# Patient Record
Sex: MALE | Race: WHITE | NOT HISPANIC OR LATINO | Employment: FULL TIME | URBAN - METROPOLITAN AREA
[De-identification: names, ages, dates, MRNs, and addresses within clinical notes are randomized per-mention and may not be internally consistent; named-entity substitution may affect disease eponyms.]

---
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
## 2021-10-07 ENCOUNTER — VIRTUAL VISIT (OUTPATIENT)
Dept: FAMILY MEDICINE | Facility: CLINIC | Age: 21
End: 2021-10-07

## 2021-10-07 DIAGNOSIS — R43.2 LOSS OF TASTE: Primary | ICD-10-CM

## 2021-10-07 DIAGNOSIS — R19.7 DIARRHEA, UNSPECIFIED TYPE: ICD-10-CM

## 2021-10-07 PROCEDURE — 99213 OFFICE O/P EST LOW 20 MIN: CPT | Mod: 95 | Performed by: NURSE PRACTITIONER

## 2021-10-07 NOTE — PROGRESS NOTES
You is a 21 year old who is being evaluated via a billable telephone visit.      What phone number would you like to be contacted at? cell  How would you like to obtain your AVS? Mail a copy    Assessment & Plan     Loss of taste  Symptoms for a few days, needs test to return to work  - Symptomatic COVID-19 Virus (Coronavirus) by PCR Nasopharyngeal; Future    Diarrhea, unspecified type  As above  - Symptomatic COVID-19 Virus (Coronavirus) by PCR Nasopharyngeal; Future      Return in about 1 week (around 10/14/2021), or if symptoms worsen or fail to improve.    ROLF Reyes, NP-C  [facility]   You is a 21 year old who presents for the following health issues  accompanied by his self:    HPI       For a few days: Loss of sense of taste. When he eats nothing tastes, if drinking a soda, it just tastes like spiky water. No fever. Had some diarrhea. No cough or SOB. Someone in the house has been sick but not with COVID-19. 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 Wears a mask when at work. Normal urination. Some chest pains but that is normal with him.         Review of Systems   Constitutional, HEENT, cardiovascular, pulmonary, gi and gu systems are negative, except as otherwise noted.      Objective           Vitals:  No vitals were obtained today due to virtual visit.    Physical Exam   healthy, alert and no distress  PSYCH: Alert and oriented times 3; coherent speech, normal   rate and volume, able to articulate logical thoughts, able   to abstract reason, no tangential thoughts, no hallucinations   or delusions  His affect is normal  RESP: No cough, no audible wheezing, able to talk in full sentences  Remainder of exam unable to be completed due to telephone visits    No results found for this or any previous visit (from the past 24 hour(s)).      Phone call duration: 6 minutes

## 2021-10-07 NOTE — LETTER
October 7, 2021      You Park  [address]   [place] [facility] [id number]

## 2021-10-07 NOTE — PATIENT INSTRUCTIONS
"Discharge Instructions for COVID-19 Patients  You have--or may have--COVID-19. Please follow the instructions listed below.   If you have a weakened immune system, discuss with your doctor any other actions you need to take.  How can I protect others?  If you have symptoms (fever, cough, body aches or trouble breathing):    Stay home and away from others (self-isolate) until:  ? Your other symptoms have resolved (gotten better). And   ? You've had no fever--and no medicine that reduces fever--for 1 full day (24 hours). And   ? At least 10 days have passed since your symptoms started. (You may need to wait 20 days. Follow the advice of your care team.)  If you don't show symptoms, but testing showed that you have COVID-19:    Stay home and away from others (self-isolate) until at least 10 days have passed since the date of your first positive COVID-19 test.  During this time    Stay in your own room, even for meals. Use your own bathroom if you can.    Stay away from others in your home. No hugging, kissing or shaking hands. No visitors.    Don't go to work, school or anywhere else.    Clean \"high touch\" surfaces often (doorknobs, counters, handles). Use household cleaning spray or wipes.    You'll find a full list of  on the EPA website: www.epa.gov/pesticide-registration/list-n-disinfectants-use-against-sars-cov-2.    Cover your mouth and nose with a mask or other face covering to avoid spreading germs.    Wash your hands and face often. Use soap and water.    Caregivers in these groups are at risk for severe illness due to COVID-19:  ? People 65 years and older  ? People who live in a nursing home or long-term care facility  ? People with chronic disease (lung, heart, cancer, diabetes, kidney, liver, immunologic)  ? People who have a weakened immune system, including those who:    Are in cancer treatment    Take medicine that weakens the immune system, such as corticosteroids    Had a bone marrow or organ " transplant    Have an immune deficiency    Have poorly controlled HIV or AIDS    Are obese (body mass index of 40 or higher)    Smoke regularly    Caregivers should wear gloves while washing dishes, handling laundry and cleaning bedrooms and bathrooms.    Use caution when washing and drying laundry: Don't shake dirty laundry and use the warmest water setting that you can.    For more tips on managing your health at home, go to www.cdc.gov/coronavirus/2019-ncov/downloads/10Things.pdf.  How can I take care of myself at home?  1. Get lots of rest. Drink extra fluids (unless a doctor has told you not to).  2. Take Tylenol (acetaminophen) for fever or pain. If you have liver or kidney problems, ask your family doctor if it's okay to take Tylenol.   Adults can take either:   ? 650 mg (two 325 mg pills) every 4 to 6 hours, or   ? 1,000 mg (two 500 mg pills) every 8 hours as needed.  ? Note: Don't take more than 3,000 mg in one day. Acetaminophen is found in many medicines (both prescribed and over-the-counter medicines). Read all labels to be sure you don't take too much.   For children, check the Tylenol bottle for the right dose. The dose is based on the child's age or weight.  3. If you have other health problems (like cancer, heart failure, an organ transplant or severe kidney disease): Call your specialty clinic if you don't feel better in the next 2 days.  4. Know when to call 911. Emergency warning signs include:  ? Trouble breathing or shortness of breath  ? Pain or pressure in the chest that doesn't go away  ? Feeling confused like you haven't felt before, or not being able to wake up  ? Bluish-colored lips or face  5. Your doctor may have prescribed a blood thinner medicine. Follow their instructions.  Where can I get more information?     [facility] - About COVID-19:   [email or web address]    CDC - What to Do If You're Sick:  www.cdc.gov/coronavirus/2019-ncov/about/steps-when-sick.html    CDC - Ending Home Isolation: www.cdc.gov/coronavirus/2019-ncov/hcp/disposition-in-home-patients.html    CDC - Caring for Someone: www.cdc.gov/coronavirus/2019-ncov/if-you-are-sick/care-for-someone.html    [facility] - Interim Guidance for Hospital Discharge to Home: www.health.[facility].[place]./diseases/coronavirus/hcp/hospdischarge.pdf    Below are the COVID-19 hotlines at the [place] Department of Health ([facility]). Interpreters are available.  ? For health questions: Call 077-539-2854 or 1-172.956.2752 (7 a.m. to 7 p.m.)  ? For questions about schools and childcare: Call 628-008-8836 or 1-886.186.9252 (7 a.m. to 7 p.m.)    For informational purposes only. Not to replace the advice of your health care provider. Clinically reviewed by Dr. Jason Mcgarry.   Copyright   2020 [facility]. All rights reserved. [employer] 286867 - REV 01/05/21.

## 2021-10-09 ENCOUNTER — LAB (OUTPATIENT)
Dept: URGENT CARE | Facility: URGENT CARE | Age: 21
End: 2021-10-09
Attending: NURSE PRACTITIONER

## 2021-10-09 DIAGNOSIS — R43.2 LOSS OF TASTE: ICD-10-CM

## 2021-10-09 DIAGNOSIS — R19.7 DIARRHEA, UNSPECIFIED TYPE: ICD-10-CM

## 2021-10-09 PROCEDURE — U0005 INFEC AGEN DETEC AMPLI PROBE: HCPCS | Mod: 90

## 2021-10-09 PROCEDURE — 99000 SPECIMEN HANDLING OFFICE-LAB: CPT

## 2021-10-09 PROCEDURE — U0003 INFECTIOUS AGENT DETECTION BY NUCLEIC ACID (DNA OR RNA); SEVERE ACUTE RESPIRATORY SYNDROME CORONAVIRUS 2 (SARS-COV-2) (CORONAVIRUS DISEASE [COVID-19]), AMPLIFIED PROBE TECHNIQUE, MAKING USE OF HIGH THROUGHPUT TECHNOLOGIES AS DESCRIBED BY CMS-2020-01-R: HCPCS | Mod: 90

## 2021-10-11 LAB — SARS-COV-2 RNA RESP QL NAA+PROBE: NOT DETECTED

## 2021-10-12 NOTE — RESULT ENCOUNTER NOTE
Pramod Orta,   Your COVID-19 swab was negative. If you have questions please let me know.Thank you,  ROLF Reyes, NP-C  [facility]

## 2021-11-28 ENCOUNTER — HEALTH MAINTENANCE LETTER (OUTPATIENT)
Age: 21
End: 2021-11-28

## 2022-09-11 ENCOUNTER — HEALTH MAINTENANCE LETTER (OUTPATIENT)
Age: 22
End: 2022-09-11

## 2023-01-22 ENCOUNTER — HEALTH MAINTENANCE LETTER (OUTPATIENT)
Age: 23
End: 2023-01-22

## 2024-02-24 ENCOUNTER — HEALTH MAINTENANCE LETTER (OUTPATIENT)
Age: 24
End: 2024-02-24

## 2024-05-30 ENCOUNTER — HOSPITAL ENCOUNTER (EMERGENCY)
Facility: CLINIC | Age: 24
Discharge: HOME | End: 2024-05-31
Attending: EMERGENCY MEDICINE | Admitting: EMERGENCY MEDICINE
Payer: COMMERCIAL

## 2024-05-30 DIAGNOSIS — R10.10 UPPER ABDOMINAL PAIN: ICD-10-CM

## 2024-05-30 DIAGNOSIS — K82.8 GALLBLADDER SLUDGE: ICD-10-CM

## 2024-05-30 DIAGNOSIS — K80.20 CALCULUS OF GALLBLADDER WITHOUT CHOLECYSTITIS WITHOUT OBSTRUCTION: ICD-10-CM

## 2024-05-30 LAB
BASOPHILS # BLD AUTO: 0.1 10E3/UL (ref 0–0.2)
BASOPHILS NFR BLD AUTO: 1 %
EOSINOPHIL # BLD AUTO: 0.1 10E3/UL (ref 0–0.7)
EOSINOPHIL NFR BLD AUTO: 2 %
ERYTHROCYTE [DISTWIDTH] IN BLOOD BY AUTOMATED COUNT: 12.1 % (ref 10–15)
HCT VFR BLD AUTO: 41.9 % (ref 40–53)
HGB BLD-MCNC: 14.7 G/DL (ref 13.3–17.7)
IMM GRANULOCYTES # BLD: 0 10E3/UL
IMM GRANULOCYTES NFR BLD: 0 %
LYMPHOCYTES # BLD AUTO: 2.2 10E3/UL (ref 0.8–5.3)
LYMPHOCYTES NFR BLD AUTO: 26 %
MCH RBC QN AUTO: 28.9 PG (ref 26.5–33)
MCHC RBC AUTO-ENTMCNC: 35.1 G/DL (ref 31.5–36.5)
MCV RBC AUTO: 82 FL (ref 78–100)
MONOCYTES # BLD AUTO: 0.5 10E3/UL (ref 0–1.3)
MONOCYTES NFR BLD AUTO: 6 %
NEUTROPHILS # BLD AUTO: 5.8 10E3/UL (ref 1.6–8.3)
NEUTROPHILS NFR BLD AUTO: 65 %
NRBC # BLD AUTO: 0 10E3/UL
NRBC BLD AUTO-RTO: 0 /100
PLATELET # BLD AUTO: 276 10E3/UL (ref 150–450)
RBC # BLD AUTO: 5.09 10E6/UL (ref 4.4–5.9)
WBC # BLD AUTO: 8.7 10E3/UL (ref 4–11)

## 2024-05-30 PROCEDURE — 250N000011 HC RX IP 250 OP 636: Performed by: EMERGENCY MEDICINE

## 2024-05-30 PROCEDURE — 250N000013 HC RX MED GY IP 250 OP 250 PS 637: Performed by: EMERGENCY MEDICINE

## 2024-05-30 PROCEDURE — 96374 THER/PROPH/DIAG INJ IV PUSH: CPT

## 2024-05-30 PROCEDURE — 82040 ASSAY OF SERUM ALBUMIN: CPT | Performed by: EMERGENCY MEDICINE

## 2024-05-30 PROCEDURE — 36415 COLL VENOUS BLD VENIPUNCTURE: CPT | Performed by: EMERGENCY MEDICINE

## 2024-05-30 PROCEDURE — 85025 COMPLETE CBC W/AUTO DIFF WBC: CPT | Performed by: EMERGENCY MEDICINE

## 2024-05-30 PROCEDURE — 99285 EMERGENCY DEPT VISIT HI MDM: CPT | Mod: 25

## 2024-05-30 PROCEDURE — 83690 ASSAY OF LIPASE: CPT | Performed by: EMERGENCY MEDICINE

## 2024-05-30 RX ORDER — FAMOTIDINE 20 MG/1
20 TABLET, FILM COATED ORAL ONCE
Status: COMPLETED | OUTPATIENT
Start: 2024-05-30 | End: 2024-05-30

## 2024-05-30 RX ORDER — KETOROLAC TROMETHAMINE 15 MG/ML
15 INJECTION, SOLUTION INTRAMUSCULAR; INTRAVENOUS ONCE
Status: COMPLETED | OUTPATIENT
Start: 2024-05-30 | End: 2024-05-30

## 2024-05-30 RX ORDER — MAGNESIUM HYDROXIDE/ALUMINUM HYDROXICE/SIMETHICONE 120; 1200; 1200 MG/30ML; MG/30ML; MG/30ML
30 SUSPENSION ORAL ONCE
Status: COMPLETED | OUTPATIENT
Start: 2024-05-30 | End: 2024-05-30

## 2024-05-30 RX ADMIN — ALUMINUM HYDROXIDE, MAGNESIUM HYDROXIDE, AND SIMETHICONE 30 ML: 1200; 120; 1200 SUSPENSION ORAL at 23:33

## 2024-05-30 RX ADMIN — KETOROLAC TROMETHAMINE 15 MG: 15 INJECTION, SOLUTION INTRAMUSCULAR; INTRAVENOUS at 23:33

## 2024-05-30 RX ADMIN — FAMOTIDINE 20 MG: 20 TABLET ORAL at 23:33

## 2024-05-30 ASSESSMENT — COLUMBIA-SUICIDE SEVERITY RATING SCALE - C-SSRS
2. HAVE YOU ACTUALLY HAD ANY THOUGHTS OF KILLING YOURSELF IN THE PAST MONTH?: NO
1. IN THE PAST MONTH, HAVE YOU WISHED YOU WERE DEAD OR WISHED YOU COULD GO TO SLEEP AND NOT WAKE UP?: NO
6. HAVE YOU EVER DONE ANYTHING, STARTED TO DO ANYTHING, OR PREPARED TO DO ANYTHING TO END YOUR LIFE?: NO

## 2024-05-30 ASSESSMENT — ACTIVITIES OF DAILY LIVING (ADL): ADLS_ACUITY_SCORE: 33

## 2024-05-30 NOTE — Clinical Note
You aPrk was seen and treated in our emergency department on 5/30/2024.  He may return to work on 06/02/2024.       If you have any questions or concerns, please don't hesitate to call.      Louise Chang MD

## 2024-05-31 ENCOUNTER — APPOINTMENT (OUTPATIENT)
Dept: ULTRASOUND IMAGING | Facility: CLINIC | Age: 24
End: 2024-05-31
Attending: EMERGENCY MEDICINE
Payer: COMMERCIAL

## 2024-05-31 VITALS
BODY MASS INDEX: 29.48 KG/M2 | HEART RATE: 91 BPM | HEIGHT: 66 IN | SYSTOLIC BLOOD PRESSURE: 144 MMHG | OXYGEN SATURATION: 98 % | DIASTOLIC BLOOD PRESSURE: 85 MMHG | TEMPERATURE: 97 F | RESPIRATION RATE: 16 BRPM | WEIGHT: 183.42 LBS

## 2024-05-31 LAB
ALBUMIN SERPL BCG-MCNC: 4.6 G/DL (ref 3.5–5.2)
ALBUMIN UR-MCNC: NEGATIVE MG/DL
ALP SERPL-CCNC: 77 U/L (ref 40–150)
ALT SERPL W P-5'-P-CCNC: 31 U/L (ref 0–70)
AMORPH CRY #/AREA URNS HPF: ABNORMAL /HPF
ANION GAP SERPL CALCULATED.3IONS-SCNC: 14 MMOL/L (ref 7–15)
APPEARANCE UR: ABNORMAL
AST SERPL W P-5'-P-CCNC: 22 U/L (ref 0–45)
BILIRUB SERPL-MCNC: 1 MG/DL
BILIRUB UR QL STRIP: NEGATIVE
BUN SERPL-MCNC: 18.5 MG/DL (ref 6–20)
CALCIUM SERPL-MCNC: 9.1 MG/DL (ref 8.6–10)
CHLORIDE SERPL-SCNC: 102 MMOL/L (ref 98–107)
COLOR UR AUTO: YELLOW
CREAT SERPL-MCNC: 1.1 MG/DL (ref 0.67–1.17)
DEPRECATED HCO3 PLAS-SCNC: 23 MMOL/L (ref 22–29)
EGFRCR SERPLBLD CKD-EPI 2021: >90 ML/MIN/1.73M2
GLUCOSE SERPL-MCNC: 97 MG/DL (ref 70–99)
GLUCOSE UR STRIP-MCNC: NEGATIVE MG/DL
HGB UR QL STRIP: NEGATIVE
KETONES UR STRIP-MCNC: 40 MG/DL
LEUKOCYTE ESTERASE UR QL STRIP: NEGATIVE
LIPASE SERPL-CCNC: 25 U/L (ref 13–60)
NITRATE UR QL: NEGATIVE
PH UR STRIP: 7 [PH] (ref 5–7)
POTASSIUM SERPL-SCNC: 3.6 MMOL/L (ref 3.4–5.3)
PROT SERPL-MCNC: 7.1 G/DL (ref 6.4–8.3)
RBC URINE: 3 /HPF
SODIUM SERPL-SCNC: 139 MMOL/L (ref 135–145)
SP GR UR STRIP: 1.02 (ref 1–1.03)
UROBILINOGEN UR STRIP-MCNC: 3 MG/DL
WBC URINE: 0 /HPF

## 2024-05-31 PROCEDURE — 76705 ECHO EXAM OF ABDOMEN: CPT

## 2024-05-31 PROCEDURE — 81001 URINALYSIS AUTO W/SCOPE: CPT | Performed by: EMERGENCY MEDICINE

## 2024-05-31 PROCEDURE — 96375 TX/PRO/DX INJ NEW DRUG ADDON: CPT

## 2024-05-31 PROCEDURE — 250N000013 HC RX MED GY IP 250 OP 250 PS 637: Performed by: EMERGENCY MEDICINE

## 2024-05-31 PROCEDURE — 250N000011 HC RX IP 250 OP 636: Mod: JZ | Performed by: EMERGENCY MEDICINE

## 2024-05-31 RX ORDER — MAGNESIUM HYDROXIDE/ALUMINUM HYDROXICE/SIMETHICONE 120; 1200; 1200 MG/30ML; MG/30ML; MG/30ML
30 SUSPENSION ORAL ONCE
Status: COMPLETED | OUTPATIENT
Start: 2024-05-31 | End: 2024-05-31

## 2024-05-31 RX ORDER — MORPHINE SULFATE 4 MG/ML
4 INJECTION, SOLUTION INTRAMUSCULAR; INTRAVENOUS ONCE
Status: COMPLETED | OUTPATIENT
Start: 2024-05-31 | End: 2024-05-31

## 2024-05-31 RX ORDER — OXYCODONE HYDROCHLORIDE 5 MG/1
5-10 TABLET ORAL EVERY 6 HOURS PRN
Qty: 10 TABLET | Refills: 0 | Status: SHIPPED | OUTPATIENT
Start: 2024-05-31 | End: 2024-06-03

## 2024-05-31 RX ORDER — OXYCODONE HYDROCHLORIDE 5 MG/1
10 TABLET ORAL ONCE
Status: COMPLETED | OUTPATIENT
Start: 2024-05-31 | End: 2024-05-31

## 2024-05-31 RX ORDER — FAMOTIDINE 20 MG/1
20 TABLET, FILM COATED ORAL ONCE
Status: COMPLETED | OUTPATIENT
Start: 2024-05-31 | End: 2024-05-31

## 2024-05-31 RX ORDER — FAMOTIDINE 20 MG/1
20 TABLET, FILM COATED ORAL 2 TIMES DAILY
Qty: 14 TABLET | Refills: 0 | Status: SHIPPED | OUTPATIENT
Start: 2024-05-31 | End: 2024-06-07

## 2024-05-31 RX ADMIN — OXYCODONE HYDROCHLORIDE 10 MG: 5 TABLET ORAL at 01:57

## 2024-05-31 RX ADMIN — FAMOTIDINE 20 MG: 20 TABLET ORAL at 01:57

## 2024-05-31 RX ADMIN — ALUMINUM HYDROXIDE, MAGNESIUM HYDROXIDE, AND SIMETHICONE 30 ML: 200; 200; 20 SUSPENSION ORAL at 01:57

## 2024-05-31 RX ADMIN — MORPHINE SULFATE 4 MG: 4 INJECTION INTRAVENOUS at 00:15

## 2024-05-31 ASSESSMENT — ACTIVITIES OF DAILY LIVING (ADL)
ADLS_ACUITY_SCORE: 35
ADLS_ACUITY_SCORE: 35

## 2024-05-31 NOTE — ED PROVIDER NOTES
"  Emergency Department Note      History of Present Illness     Chief Complaint  Abdominal Pain    HPI  You Park is a 23 year old male who presents emergency department with upper abdominal pain that he finds difficult to describe it is localized to the upper abdomen.  This started around 4 PM.  Denies any known trigger.  Denies any associate symptoms such as nausea, vomiting or diarrhea.  He has had regular bowel movements.  He denies any bloody or black appearance to the stool.  He denies fever or chills.  He notes he had similar pain in the past that resolved on its own he not think too much of it so has not sought assessment for it.  He denies urine changes.  He denies alcohol use or regular NSAID use.    Independent Historian  None    Review of External Notes  None  Past Medical History   Medical History and Problem List  Past Medical History:   Diagnosis Date    Seizures (H)        Medications  Denies daily medications    Surgical History   No past surgical history on file.  Physical Exam   Patient Vitals for the past 24 hrs:   BP Temp Temp src Pulse Resp SpO2 Height Weight   05/30/24 2204 (!) 149/85 97  F (36.1  C) Temporal 96 18 100 % 1.676 m (5' 6\") 83.2 kg (183 lb 6.8 oz)     Physical Exam  General: Adult male sitting upright  Eyes: PERRL, Conjunctive within normal limits.  No scleral icterus.  ENT: Moist mucous membranes, oropharynx clear.   CV: Normal S1S2, no murmur, rub or gallop. Regular rate and rhythm  Resp: Clear to auscultation bilaterally, no wheezes, rales or rhonchi. Normal respiratory effort.  GI: Abdomen is soft nondistended.  Epigastric and right upper quadrant tenderness to palpation.  No palpable masses. No rebound or guarding.  MSK: No edema.  Normal active range of motion.  Skin: Warm and dry. No rashes or lesions or ecchymoses on visible skin.  Neuro: Alert and oriented. Responds appropriately to all questions and commands. No focal findings appreciated. Normal muscle " tone.  Psych: Appropriate mood and affect    Diagnostics   Lab Results   Labs Ordered and Resulted from Time of ED Arrival to Time of ED Departure   ROUTINE UA WITH MICROSCOPIC REFLEX TO CULTURE - Abnormal       Result Value    Color Urine Yellow      Appearance Urine Cloudy (*)     Glucose Urine Negative      Bilirubin Urine Negative      Ketones Urine 40 (*)     Specific Gravity Urine 1.023      Blood Urine Negative      pH Urine 7.0      Protein Albumin Urine Negative      Urobilinogen Urine 3.0 (*)     Nitrite Urine Negative      Leukocyte Esterase Urine Negative      Amorphous Crystals Urine Few (*)     RBC Urine 3 (*)     WBC Urine 0     COMPREHENSIVE METABOLIC PANEL - Normal    Sodium 139      Potassium 3.6      Carbon Dioxide (CO2) 23      Anion Gap 14      Urea Nitrogen 18.5      Creatinine 1.10      GFR Estimate >90      Calcium 9.1      Chloride 102      Glucose 97      Alkaline Phosphatase 77      AST 22      ALT 31      Protein Total 7.1      Albumin 4.6      Bilirubin Total 1.0     LIPASE - Normal    Lipase 25     CBC WITH PLATELETS AND DIFFERENTIAL    WBC Count 8.7      RBC Count 5.09      Hemoglobin 14.7      Hematocrit 41.9      MCV 82      MCH 28.9      MCHC 35.1      RDW 12.1      Platelet Count 276      % Neutrophils 65      % Lymphocytes 26      % Monocytes 6      % Eosinophils 2      % Basophils 1      % Immature Granulocytes 0      NRBCs per 100 WBC 0      Absolute Neutrophils 5.8      Absolute Lymphocytes 2.2      Absolute Monocytes 0.5      Absolute Eosinophils 0.1      Absolute Basophils 0.1      Absolute Immature Granulocytes 0.0      Absolute NRBCs 0.0         Imaging  US Abdomen Limited   Final Result   IMPRESSION:   Sludge and stones in the gallbladder. No specific evidence for acute cholecystitis.              Independent Interpretation  None  ED Course    Medications Administered  Medications   ketorolac (TORADOL) injection 15 mg (15 mg Intravenous $Given 5/30/24 6981)   famotidine  (PEPCID) tablet 20 mg (20 mg Oral $Given 5/30/24 2333)   alum & mag hydroxide-simethicone (MAALOX) suspension 30 mL (30 mLs Oral $Given 5/30/24 2333)   morphine (PF) injection 4 mg (4 mg Intravenous $Given 5/31/24 0015)   oxyCODONE (ROXICODONE) tablet 10 mg (10 mg Oral $Given 5/31/24 0157)   famotidine (PEPCID) tablet 20 mg (20 mg Oral $Given 5/31/24 0157)   alum & mag hydroxide-simethicone (MAALOX) suspension 30 mL (30 mLs Oral $Given 5/31/24 0157)         Discussion of Management  None    Social Determinants of Health adding to complexity of care  None    ED Course  Past medical records, nursing notes, and vitals reviewed.  2113: I performed an exam of the patient and obtained history, as documented above.   I reassessed the patient.  I discussed findings of today's evaluation.  He pulls himself up into bed without any obvious discomfort.  He looks more comfortable than on arrival.  He notes mild ongoing pain but is agreeable to plan for discharge.  He has been tolerating p.o.    Medical Decision Making / Diagnosis   MDM  You Park is a 23 year old male who presents emergency department concerns for upper abdominal pain.  Multiple etiologies were considered including PUD, gastritis, cholecystitis, biliary colic, hepatitis, pancreatitis, etc.  The rest of his abdomen is benign aside from epigastric and right upper quadrant region.  He has gallbladder sludge and gallstones noted.  No signs of cholecystitis.  No signs of obstructive pathology on labs.  No leukocytosis or fever.  This seems less likely to represent acute life-threatening pathology at this time given the patient's improvement with symptoms and reassuring evaluation I feel comfortable discharging him home.  This still could be biliary colic and/or peptic ulcer disease or gastritis.  Supportive care measures discussed at home.  Symptomatic treatment discussed.  Primary care referral made for the patient for follow-up in the upcoming days.  Return  precautions including fever, increasing pain, uncontrolled vomiting, etc. were discussed with the patient.  All questions were answered prior to discharge.    Disposition  The patient was discharged.     ICD-10 Codes:    ICD-10-CM    1. Upper abdominal pain  R10.10 Primary Care Referral      2. Calculus of gallbladder without cholecystitis without obstruction  K80.20 Primary Care Referral      3. Gallbladder sludge  K82.8 Primary Care Referral           Discharge Medications  New Prescriptions    FAMOTIDINE (PEPCID) 20 MG TABLET    Take 1 tablet (20 mg) by mouth 2 times daily for 7 days    OXYCODONE (ROXICODONE) 5 MG TABLET    Take 1-2 tablets (5-10 mg) by mouth every 6 hours as needed for severe pain         MD Bernardo Reyes Tracy Dianne, MD  05/31/24 0228

## 2024-05-31 NOTE — ED TRIAGE NOTES
Pt arrives with abdominal pain that he states started at approx 1800.  He states it is a very strong pain that he cannot describe. Denies diarrhea or nausea      Triage Assessment (Adult)       Row Name 05/30/24 2202          Triage Assessment    Airway WDL WDL        Respiratory WDL    Respiratory WDL WDL        Cardiac WDL    Cardiac WDL WDL

## 2024-06-06 ENCOUNTER — OFFICE VISIT (OUTPATIENT)
Dept: FAMILY MEDICINE | Facility: CLINIC | Age: 24
End: 2024-06-06
Payer: COMMERCIAL

## 2024-06-06 VITALS
DIASTOLIC BLOOD PRESSURE: 84 MMHG | WEIGHT: 177 LBS | SYSTOLIC BLOOD PRESSURE: 126 MMHG | HEIGHT: 66 IN | OXYGEN SATURATION: 98 % | TEMPERATURE: 98.5 F | BODY MASS INDEX: 28.45 KG/M2 | HEART RATE: 84 BPM | RESPIRATION RATE: 16 BRPM

## 2024-06-06 DIAGNOSIS — K82.8 GALLBLADDER SLUDGE: ICD-10-CM

## 2024-06-06 DIAGNOSIS — K80.20 CALCULUS OF GALLBLADDER WITHOUT CHOLECYSTITIS WITHOUT OBSTRUCTION: ICD-10-CM

## 2024-06-06 DIAGNOSIS — R10.10 UPPER ABDOMINAL PAIN: Primary | ICD-10-CM

## 2024-06-06 PROCEDURE — G2211 COMPLEX E/M VISIT ADD ON: HCPCS

## 2024-06-06 PROCEDURE — 99213 OFFICE O/P EST LOW 20 MIN: CPT

## 2024-06-06 ASSESSMENT — PAIN SCALES - GENERAL: PAINLEVEL: NO PAIN (0)

## 2024-06-06 NOTE — PROGRESS NOTES
Assessment & Plan     Upper abdominal pain  Calculus of gallbladder without cholecystitis without obstruction  Gallbladder sludge  Patient presents for ED follow up from 5/30/24 for abdominal pain. US showed gallbladder sludge and gallstones without characteristics of cholecystitis. Patient has been following an altered diet which has greatly improved his pain. He has not needed to use any pain medication. Discussed pathophysiology of gallstones. Discussed treatment options including dietary restriction vs general surgery referral. At this time patient opted to see how dietary changes work for him. He is educated regarding worrisome symptoms and when to present for emergent evaluation. He is educated should he not have relief with dietary changes that he can let me know and I can place a referral to general surgery. The patient expressed understanding and was in agreement with the plan of care.    The longitudinal plan of care for the diagnosis(es)/condition(s) as documented were addressed during this visit. Due to the added complexity in care, I will continue to support Tj in the subsequent management and with ongoing continuity of care.      MED REC REQUIRED  Post Medication Reconciliation Status:       Subjective   Tj is a 23 year old, presenting for the following health issues:  Hospital F/U (Pt feels better, pt states wanting to know next steps. Having diarrhea in the mornings after coffee. )        6/6/2024     1:49 PM   Additional Questions   Roomed by Leydi MARTI LPN     HPI     Seen in the ED on 5/30/24 for abdominal pain. Had an abdominal US completed which showed sludge and gallstones with no specific evidence for acute cholecystitis.     Since his ED visit pain has slowly been decreasing. He has not needed the oxycodone at all for pain. No fevers. Was having some diarrhea but this has resolved. Denies any jaz colored, melena, hematochezia. No coca-cola colored urine. Hasn't noticed any jaundice or  "scleral icterus.     Does notice some pain a few hours after eating. Has been limiting his diet to lean meats and low carbohydrate/sugar foods.     Hospital Follow-up Visit:    Hospital/Nursing Home/IP Rehab Facility: [facility]  Date of Admission: 05/30/24  Date of Discharge: 05/31/24  Reason(s) for Admission: abdominal pain  Was the patient in the ICU or did the patient experience delirium during hospitalization?  No  Do you have any other stressors you would like to discuss with your provider? No    Problems taking medications regularly:  None  Medication changes since discharge: Oxycodone PRN (pt has not taken any)  Problems adhering to non-medication therapy:  None    Summary of hospitalization:  [facility] discharge summary reviewed  Diagnostic Tests/Treatments reviewed.  Follow up needed: none  Other Healthcare Providers Involved in Patient s Care:         None  Update since discharge: improved.       Plan of care communicated with patient           Review of Systems  Constitutional, HEENT, cardiovascular, pulmonary, gi and gu systems are negative, except as otherwise noted.      Objective    /84   Pulse 84   Temp 98.5  F (36.9  C) (Oral)   Resp 16   Ht 1.676 m (5' 6\")   Wt 80.3 kg (177 lb)   SpO2 98%   BMI 28.57 kg/m    Body mass index is 28.57 kg/m .  Physical Exam   GENERAL: alert and no distress  EYES: Eyes grossly normal to inspection, conjunctivae and sclerae normal  NECK: no visualized asymmetry, masses, or scars  RESP: normal respiratory effort  SKIN: no suspicious lesions or rashes on visualized skin  PSYCH: mentation appears normal, affect normal/bright    Admission on 05/30/2024, Discharged on 05/31/2024   Component Date Value Ref Range Status     Sodium 05/30/2024 139  135 - 145 mmol/L Final    Reference intervals for this test were updated on 09/26/2023 to more accurately reflect our healthy population. There may be differences in the flagging " of prior results with similar values performed with this method. Interpretation of those prior results can be made in the context of the updated reference intervals.      Potassium 05/30/2024 3.6  3.4 - 5.3 mmol/L Final     Carbon Dioxide (CO2) 05/30/2024 23  22 - 29 mmol/L Final     Anion Gap 05/30/2024 14  7 - 15 mmol/L Final     Urea Nitrogen 05/30/2024 18.5  6.0 - 20.0 mg/dL Final     Creatinine 05/30/2024 1.10  0.67 - 1.17 mg/dL Final     GFR Estimate 05/30/2024 >90  >60 mL/min/1.73m2 Final     Calcium 05/30/2024 9.1  8.6 - 10.0 mg/dL Final     Chloride 05/30/2024 102  98 - 107 mmol/L Final     Glucose 05/30/2024 97  70 - 99 mg/dL Final     Alkaline Phosphatase 05/30/2024 77  40 - 150 U/L Final     AST 05/30/2024 22  0 - 45 U/L Final    Reference intervals for this test were updated on 6/12/2023 to more accurately reflect our healthy population. There may be differences in the flagging of prior results with similar values performed with this method. Interpretation of those prior results can be made in the context of the updated reference intervals.     ALT 05/30/2024 31  0 - 70 U/L Final    Reference intervals for this test were updated on 6/12/2023 to more accurately reflect our healthy population. There may be differences in the flagging of prior results with similar values performed with this method. Interpretation of those prior results can be made in the context of the updated reference intervals.       Protein Total 05/30/2024 7.1  6.4 - 8.3 g/dL Final     Albumin 05/30/2024 4.6  3.5 - 5.2 g/dL Final     Bilirubin Total 05/30/2024 1.0  <=1.2 mg/dL Final     Lipase 05/30/2024 25  13 - 60 U/L Final     Color Urine 05/31/2024 Yellow  Colorless, Straw, Light Yellow, Yellow Final     Appearance Urine 05/31/2024 Cloudy (A)  Clear Final     Glucose Urine 05/31/2024 Negative  Negative mg/dL Final     Bilirubin Urine 05/31/2024 Negative  Negative Final     Ketones Urine 05/31/2024 40 (A)  Negative mg/dL Final      Specific Gravity Urine 05/31/2024 1.023  1.003 - 1.035 Final     Blood Urine 05/31/2024 Negative  Negative Final     pH Urine 05/31/2024 7.0  5.0 - 7.0 Final     Protein Albumin Urine 05/31/2024 Negative  Negative mg/dL Final     Urobilinogen Urine 05/31/2024 3.0 (A)  Normal, 2.0 mg/dL Final     Nitrite Urine 05/31/2024 Negative  Negative Final     Leukocyte Esterase Urine 05/31/2024 Negative  Negative Final     Amorphous Crystals Urine 05/31/2024 Few (A)  None Seen /HPF Final     RBC Urine 05/31/2024 3 (H)  <=2 /HPF Final     WBC Urine 05/31/2024 0  <=5 /HPF Final     WBC Count 05/30/2024 8.7  4.0 - 11.0 10e3/uL Final     RBC Count 05/30/2024 5.09  4.40 - 5.90 10e6/uL Final     Hemoglobin 05/30/2024 14.7  13.3 - 17.7 g/dL Final     Hematocrit 05/30/2024 41.9  40.0 - 53.0 % Final     MCV 05/30/2024 82  78 - 100 fL Final     MCH 05/30/2024 28.9  26.5 - 33.0 pg Final     MCHC 05/30/2024 35.1  31.5 - 36.5 g/dL Final     RDW 05/30/2024 12.1  10.0 - 15.0 % Final     Platelet Count 05/30/2024 276  150 - 450 10e3/uL Final     % Neutrophils 05/30/2024 65  % Final     % Lymphocytes 05/30/2024 26  % Final     % Monocytes 05/30/2024 6  % Final     % Eosinophils 05/30/2024 2  % Final     % Basophils 05/30/2024 1  % Final     % Immature Granulocytes 05/30/2024 0  % Final     NRBCs per 100 WBC 05/30/2024 0  <1 /100 Final     Absolute Neutrophils 05/30/2024 5.8  1.6 - 8.3 10e3/uL Final     Absolute Lymphocytes 05/30/2024 2.2  0.8 - 5.3 10e3/uL Final     Absolute Monocytes 05/30/2024 0.5  0.0 - 1.3 10e3/uL Final     Absolute Eosinophils 05/30/2024 0.1  0.0 - 0.7 10e3/uL Final     Absolute Basophils 05/30/2024 0.1  0.0 - 0.2 10e3/uL Final     Absolute Immature Granulocytes 05/30/2024 0.0  <=0.4 10e3/uL Final     Absolute NRBCs 05/30/2024 0.0  10e3/uL Final           Signed Electronically by: Michelle Holden PA-C

## 2024-09-15 ENCOUNTER — HOSPITAL ENCOUNTER (INPATIENT)
Facility: CLINIC | Age: 24
LOS: 1 days | Discharge: HOME | End: 2024-09-17
Attending: EMERGENCY MEDICINE | Admitting: INTERNAL MEDICINE
Payer: COMMERCIAL

## 2024-09-15 ENCOUNTER — NURSE TRIAGE (OUTPATIENT)
Dept: NURSING | Facility: CLINIC | Age: 24
End: 2024-09-15
Payer: COMMERCIAL

## 2024-09-15 ENCOUNTER — APPOINTMENT (OUTPATIENT)
Dept: ULTRASOUND IMAGING | Facility: CLINIC | Age: 24
End: 2024-09-15
Attending: EMERGENCY MEDICINE
Payer: COMMERCIAL

## 2024-09-15 DIAGNOSIS — K80.50 CHOLEDOCHOLITHIASIS: ICD-10-CM

## 2024-09-15 DIAGNOSIS — K81.0 ACUTE CHOLECYSTITIS: ICD-10-CM

## 2024-09-15 LAB
ALBUMIN SERPL BCG-MCNC: 4.8 G/DL (ref 3.5–5.2)
ALP SERPL-CCNC: 136 U/L (ref 40–150)
ALT SERPL W P-5'-P-CCNC: 529 U/L (ref 0–70)
ANION GAP SERPL CALCULATED.3IONS-SCNC: 12 MMOL/L (ref 7–15)
AST SERPL W P-5'-P-CCNC: 357 U/L (ref 0–45)
BASOPHILS # BLD AUTO: 0.1 10E3/UL (ref 0–0.2)
BASOPHILS NFR BLD AUTO: 1 %
BILIRUB SERPL-MCNC: 3.8 MG/DL
BUN SERPL-MCNC: 7.9 MG/DL (ref 6–20)
CALCIUM SERPL-MCNC: 9.7 MG/DL (ref 8.8–10.4)
CHLORIDE SERPL-SCNC: 103 MMOL/L (ref 98–107)
CREAT SERPL-MCNC: 1.03 MG/DL (ref 0.67–1.17)
EGFRCR SERPLBLD CKD-EPI 2021: >90 ML/MIN/1.73M2
EOSINOPHIL # BLD AUTO: 0.1 10E3/UL (ref 0–0.7)
EOSINOPHIL NFR BLD AUTO: 1 %
ERYTHROCYTE [DISTWIDTH] IN BLOOD BY AUTOMATED COUNT: 12.2 % (ref 10–15)
GLUCOSE SERPL-MCNC: 107 MG/DL (ref 70–99)
HCO3 SERPL-SCNC: 25 MMOL/L (ref 22–29)
HCT VFR BLD AUTO: 44.4 % (ref 40–53)
HGB BLD-MCNC: 15.4 G/DL (ref 13.3–17.7)
HOLD SPECIMEN: NORMAL
HOLD SPECIMEN: NORMAL
IMM GRANULOCYTES # BLD: 0 10E3/UL
IMM GRANULOCYTES NFR BLD: 0 %
LIPASE SERPL-CCNC: 24 U/L (ref 13–60)
LYMPHOCYTES # BLD AUTO: 1.3 10E3/UL (ref 0.8–5.3)
LYMPHOCYTES NFR BLD AUTO: 16 %
MCH RBC QN AUTO: 28.5 PG (ref 26.5–33)
MCHC RBC AUTO-ENTMCNC: 34.7 G/DL (ref 31.5–36.5)
MCV RBC AUTO: 82 FL (ref 78–100)
MONOCYTES # BLD AUTO: 0.6 10E3/UL (ref 0–1.3)
MONOCYTES NFR BLD AUTO: 7 %
NEUTROPHILS # BLD AUTO: 5.7 10E3/UL (ref 1.6–8.3)
NEUTROPHILS NFR BLD AUTO: 75 %
NRBC # BLD AUTO: 0 10E3/UL
NRBC BLD AUTO-RTO: 0 /100
PLATELET # BLD AUTO: 272 10E3/UL (ref 150–450)
POTASSIUM SERPL-SCNC: 3.9 MMOL/L (ref 3.4–5.3)
PROT SERPL-MCNC: 7.4 G/DL (ref 6.4–8.3)
RBC # BLD AUTO: 5.41 10E6/UL (ref 4.4–5.9)
SODIUM SERPL-SCNC: 140 MMOL/L (ref 135–145)
WBC # BLD AUTO: 7.7 10E3/UL (ref 4–11)

## 2024-09-15 PROCEDURE — 99285 EMERGENCY DEPT VISIT HI MDM: CPT | Mod: 25

## 2024-09-15 PROCEDURE — 96361 HYDRATE IV INFUSION ADD-ON: CPT

## 2024-09-15 PROCEDURE — 250N000011 HC RX IP 250 OP 636: Performed by: EMERGENCY MEDICINE

## 2024-09-15 PROCEDURE — 80053 COMPREHEN METABOLIC PANEL: CPT | Performed by: EMERGENCY MEDICINE

## 2024-09-15 PROCEDURE — 83690 ASSAY OF LIPASE: CPT | Performed by: EMERGENCY MEDICINE

## 2024-09-15 PROCEDURE — 82248 BILIRUBIN DIRECT: CPT | Performed by: INTERNAL MEDICINE

## 2024-09-15 PROCEDURE — 96374 THER/PROPH/DIAG INJ IV PUSH: CPT | Mod: 59

## 2024-09-15 PROCEDURE — 85025 COMPLETE CBC W/AUTO DIFF WBC: CPT | Performed by: EMERGENCY MEDICINE

## 2024-09-15 PROCEDURE — 36415 COLL VENOUS BLD VENIPUNCTURE: CPT | Performed by: EMERGENCY MEDICINE

## 2024-09-15 PROCEDURE — 258N000003 HC RX IP 258 OP 636: Performed by: EMERGENCY MEDICINE

## 2024-09-15 PROCEDURE — 76705 ECHO EXAM OF ABDOMEN: CPT

## 2024-09-15 RX ORDER — HYDROMORPHONE HYDROCHLORIDE 1 MG/ML
0.5 INJECTION, SOLUTION INTRAMUSCULAR; INTRAVENOUS; SUBCUTANEOUS EVERY 30 MIN PRN
Status: DISCONTINUED | OUTPATIENT
Start: 2024-09-15 | End: 2024-09-16

## 2024-09-15 RX ORDER — KETOROLAC TROMETHAMINE 15 MG/ML
15 INJECTION, SOLUTION INTRAMUSCULAR; INTRAVENOUS ONCE
Status: COMPLETED | OUTPATIENT
Start: 2024-09-15 | End: 2024-09-15

## 2024-09-15 RX ADMIN — KETOROLAC TROMETHAMINE 15 MG: 15 INJECTION, SOLUTION INTRAMUSCULAR; INTRAVENOUS at 22:56

## 2024-09-15 RX ADMIN — SODIUM CHLORIDE 1000 ML: 9 INJECTION, SOLUTION INTRAVENOUS at 23:23

## 2024-09-15 ASSESSMENT — ACTIVITIES OF DAILY LIVING (ADL): ADLS_ACUITY_SCORE: 35

## 2024-09-16 ENCOUNTER — APPOINTMENT (OUTPATIENT)
Dept: GENERAL RADIOLOGY | Facility: CLINIC | Age: 24
End: 2024-09-16
Attending: PHYSICIAN ASSISTANT
Payer: COMMERCIAL

## 2024-09-16 ENCOUNTER — APPOINTMENT (OUTPATIENT)
Dept: MRI IMAGING | Facility: CLINIC | Age: 24
End: 2024-09-16
Attending: PHYSICIAN ASSISTANT
Payer: COMMERCIAL

## 2024-09-16 PROBLEM — K80.50 CHOLEDOCHOLITHIASIS: Status: ACTIVE | Noted: 2024-09-16

## 2024-09-16 PROBLEM — K81.0 ACUTE CHOLECYSTITIS: Status: ACTIVE | Noted: 2024-09-16

## 2024-09-16 LAB
ALBUMIN SERPL BCG-MCNC: 4 G/DL (ref 3.5–5.2)
ALP SERPL-CCNC: 147 U/L (ref 40–150)
ALT SERPL W P-5'-P-CCNC: 456 U/L (ref 0–70)
AST SERPL W P-5'-P-CCNC: 237 U/L (ref 0–45)
BILIRUB DIRECT SERPL-MCNC: 2.11 MG/DL (ref 0–0.3)
BILIRUB DIRECT SERPL-MCNC: 2.55 MG/DL (ref 0–0.3)
BILIRUB SERPL-MCNC: 3.6 MG/DL
ERYTHROCYTE [DISTWIDTH] IN BLOOD BY AUTOMATED COUNT: 12.3 % (ref 10–15)
HCT VFR BLD AUTO: 40.4 % (ref 40–53)
HGB BLD-MCNC: 13.5 G/DL (ref 13.3–17.7)
MCH RBC QN AUTO: 28.3 PG (ref 26.5–33)
MCHC RBC AUTO-ENTMCNC: 33.4 G/DL (ref 31.5–36.5)
MCV RBC AUTO: 85 FL (ref 78–100)
PLATELET # BLD AUTO: 224 10E3/UL (ref 150–450)
PROT SERPL-MCNC: 6 G/DL (ref 6.4–8.3)
RBC # BLD AUTO: 4.77 10E6/UL (ref 4.4–5.9)
WBC # BLD AUTO: 6.9 10E3/UL (ref 4–11)

## 2024-09-16 PROCEDURE — 250N000011 HC RX IP 250 OP 636: Performed by: EMERGENCY MEDICINE

## 2024-09-16 PROCEDURE — 70030 X-RAY EYE FOR FOREIGN BODY: CPT

## 2024-09-16 PROCEDURE — G0378 HOSPITAL OBSERVATION PER HR: HCPCS

## 2024-09-16 PROCEDURE — 96375 TX/PRO/DX INJ NEW DRUG ADDON: CPT

## 2024-09-16 PROCEDURE — 99207 PR NO BILLABLE SERVICE THIS VISIT: CPT | Performed by: PHYSICIAN ASSISTANT

## 2024-09-16 PROCEDURE — 74183 MRI ABD W/O CNTR FLWD CNTR: CPT

## 2024-09-16 PROCEDURE — 36415 COLL VENOUS BLD VENIPUNCTURE: CPT | Performed by: INTERNAL MEDICINE

## 2024-09-16 PROCEDURE — 96376 TX/PRO/DX INJ SAME DRUG ADON: CPT

## 2024-09-16 PROCEDURE — 80076 HEPATIC FUNCTION PANEL: CPT | Performed by: INTERNAL MEDICINE

## 2024-09-16 PROCEDURE — 85027 COMPLETE CBC AUTOMATED: CPT | Performed by: INTERNAL MEDICINE

## 2024-09-16 PROCEDURE — 250N000011 HC RX IP 250 OP 636: Performed by: INTERNAL MEDICINE

## 2024-09-16 PROCEDURE — A9585 GADOBUTROL INJECTION: HCPCS | Performed by: PHYSICIAN ASSISTANT

## 2024-09-16 PROCEDURE — 99244 OFF/OP CNSLTJ NEW/EST MOD 40: CPT | Mod: 57 | Performed by: SURGERY

## 2024-09-16 PROCEDURE — 258N000003 HC RX IP 258 OP 636: Performed by: INTERNAL MEDICINE

## 2024-09-16 PROCEDURE — 255N000002 HC RX 255 OP 636: Performed by: PHYSICIAN ASSISTANT

## 2024-09-16 PROCEDURE — 99222 1ST HOSP IP/OBS MODERATE 55: CPT | Performed by: INTERNAL MEDICINE

## 2024-09-16 RX ORDER — HYDROMORPHONE HCL IN WATER/PF 6 MG/30 ML
0.2 PATIENT CONTROLLED ANALGESIA SYRINGE INTRAVENOUS
Status: DISCONTINUED | OUTPATIENT
Start: 2024-09-16 | End: 2024-09-17 | Stop reason: HOSPADM

## 2024-09-16 RX ORDER — METRONIDAZOLE 500 MG/100ML
500 INJECTION, SOLUTION INTRAVENOUS ONCE
Status: COMPLETED | OUTPATIENT
Start: 2024-09-16 | End: 2024-09-16

## 2024-09-16 RX ORDER — NALOXONE HYDROCHLORIDE 0.4 MG/ML
0.2 INJECTION, SOLUTION INTRAMUSCULAR; INTRAVENOUS; SUBCUTANEOUS
Status: DISCONTINUED | OUTPATIENT
Start: 2024-09-16 | End: 2024-09-17 | Stop reason: HOSPADM

## 2024-09-16 RX ORDER — NALOXONE HYDROCHLORIDE 0.4 MG/ML
0.4 INJECTION, SOLUTION INTRAMUSCULAR; INTRAVENOUS; SUBCUTANEOUS
Status: DISCONTINUED | OUTPATIENT
Start: 2024-09-16 | End: 2024-09-17 | Stop reason: HOSPADM

## 2024-09-16 RX ORDER — CEFTRIAXONE 2 G/1
2 INJECTION, POWDER, FOR SOLUTION INTRAMUSCULAR; INTRAVENOUS EVERY 24 HOURS
Status: DISCONTINUED | OUTPATIENT
Start: 2024-09-16 | End: 2024-09-17 | Stop reason: HOSPADM

## 2024-09-16 RX ORDER — ONDANSETRON 4 MG/1
4 TABLET, ORALLY DISINTEGRATING ORAL EVERY 6 HOURS PRN
Status: DISCONTINUED | OUTPATIENT
Start: 2024-09-16 | End: 2024-09-17 | Stop reason: HOSPADM

## 2024-09-16 RX ORDER — ONDANSETRON 2 MG/ML
4 INJECTION INTRAMUSCULAR; INTRAVENOUS EVERY 6 HOURS PRN
Status: DISCONTINUED | OUTPATIENT
Start: 2024-09-16 | End: 2024-09-17 | Stop reason: HOSPADM

## 2024-09-16 RX ORDER — HYDROMORPHONE HCL IN WATER/PF 6 MG/30 ML
0.4 PATIENT CONTROLLED ANALGESIA SYRINGE INTRAVENOUS
Status: DISCONTINUED | OUTPATIENT
Start: 2024-09-16 | End: 2024-09-17 | Stop reason: HOSPADM

## 2024-09-16 RX ORDER — SODIUM CHLORIDE 9 MG/ML
INJECTION, SOLUTION INTRAVENOUS CONTINUOUS
Status: DISCONTINUED | OUTPATIENT
Start: 2024-09-16 | End: 2024-09-17 | Stop reason: HOSPADM

## 2024-09-16 RX ORDER — CEFTRIAXONE 2 G/1
2 INJECTION, POWDER, FOR SOLUTION INTRAMUSCULAR; INTRAVENOUS ONCE
Status: COMPLETED | OUTPATIENT
Start: 2024-09-16 | End: 2024-09-16

## 2024-09-16 RX ORDER — GADOBUTROL 604.72 MG/ML
8 INJECTION INTRAVENOUS ONCE
Status: COMPLETED | OUTPATIENT
Start: 2024-09-16 | End: 2024-09-16

## 2024-09-16 RX ADMIN — SODIUM CHLORIDE: 9 INJECTION, SOLUTION INTRAVENOUS at 02:39

## 2024-09-16 RX ADMIN — GADOBUTROL 8 ML: 604.72 INJECTION INTRAVENOUS at 11:11

## 2024-09-16 RX ADMIN — SODIUM CHLORIDE: 9 INJECTION, SOLUTION INTRAVENOUS at 22:14

## 2024-09-16 RX ADMIN — HYDROMORPHONE HYDROCHLORIDE 0.5 MG: 1 INJECTION, SOLUTION INTRAMUSCULAR; INTRAVENOUS; SUBCUTANEOUS at 00:08

## 2024-09-16 RX ADMIN — CEFTRIAXONE 2 G: 2 INJECTION, POWDER, FOR SOLUTION INTRAMUSCULAR; INTRAVENOUS at 01:01

## 2024-09-16 RX ADMIN — HYDROMORPHONE HYDROCHLORIDE 0.2 MG: 0.2 INJECTION, SOLUTION INTRAMUSCULAR; INTRAVENOUS; SUBCUTANEOUS at 07:52

## 2024-09-16 RX ADMIN — METRONIDAZOLE 500 MG: 500 INJECTION, SOLUTION INTRAVENOUS at 01:33

## 2024-09-16 RX ADMIN — CEFTRIAXONE 2 G: 2 INJECTION, POWDER, FOR SOLUTION INTRAMUSCULAR; INTRAVENOUS at 22:02

## 2024-09-16 ASSESSMENT — ACTIVITIES OF DAILY LIVING (ADL)
ADLS_ACUITY_SCORE: 31
ADLS_ACUITY_SCORE: 35
ADLS_ACUITY_SCORE: 31
ADLS_ACUITY_SCORE: 35
ADLS_ACUITY_SCORE: 31

## 2024-09-16 NOTE — PLAN OF CARE
"PRIMARY DIAGNOSIS: Acute Cholecystitis  OUTPATIENT/OBSERVATION GOALS TO BE MET BEFORE DISCHARGE:  ADLs back to baseline: Yes    Activity and level of assistance: Ambulating independently.    Pain status: Improved but still requiring IV narcotics.    Return to near baseline physical activity: Yes     Discharge Planner Nurse   Safe discharge environment identified: No  Barriers to discharge: Yes       Entered by: Amarilis Crespo RN 09/16/2024 12:55 PM   Reporting abdominal pain 4/10. Pain 0/10 after IV PRN meds. NPO. AxOx4. Independent in room.   Please review provider order for any additional goals.   Nurse to notify provider when observation goals have been met and patient is ready for discharge.Goal Outcome Evaluation:      Plan of Care Reviewed With: patient    Overall Patient Progress: improvingOverall Patient Progress: improving    Outcome Evaluation: Pain 4/10. Pain 0/10 after PRN IV medication      Problem: Adult Inpatient Plan of Care  Goal: Plan of Care Review  Description: The Plan of Care Review/Shift note should be completed every shift.  The Outcome Evaluation is a brief statement about your assessment that the patient is improving, declining, or no change.  This information will be displayed automatically on your shift  note.  Outcome: Progressing  Flowsheets (Taken 9/16/2024 1254)  Outcome Evaluation: Pain 4/10. Pain 0/10 after PRN IV medication  Plan of Care Reviewed With: patient  Overall Patient Progress: improving  Goal: Patient-Specific Goal (Individualized)  Description: You can add care plan individualizations to a care plan. Examples of Individualization might be:  \"Parent requests to be called daily at 9am for status\", \"I have a hard time hearing out of my right ear\", or \"Do not touch me to wake me up as it startles  me\".  Outcome: Progressing  Goal: Absence of Hospital-Acquired Illness or Injury  Outcome: Progressing  Intervention: Identify and Manage Fall Risk  Recent Flowsheet " Documentation  Taken 9/16/2024 0900 by Amarilis Crespo RN  Safety Promotion/Fall Prevention:   safety round/check completed   room organization consistent   room door open   patient and family education   nonskid shoes/slippers when out of bed   clutter free environment maintained  Intervention: Prevent Skin Injury  Recent Flowsheet Documentation  Taken 9/16/2024 0900 by Amarilis Crespo RN  Body Position: position changed independently  Taken 9/16/2024 0752 by Amarilis Crespo RN  Body Position: position changed independently  Goal: Optimal Comfort and Wellbeing  Outcome: Progressing  Intervention: Monitor Pain and Promote Comfort  Recent Flowsheet Documentation  Taken 9/16/2024 0752 by Aamrilis Crespo RN  Pain Management Interventions: medication (see MAR)  Goal: Readiness for Transition of Care  Outcome: Progressing     Problem: Cholecystectomy  Goal: Absence of Bleeding  Outcome: Progressing  Goal: Effective Bowel Elimination  Outcome: Progressing  Goal: Fluid and Electrolyte Balance  Outcome: Progressing  Goal: Absence of Infection Signs and Symptoms  Outcome: Progressing  Goal: Anesthesia/Sedation Recovery  Outcome: Progressing  Intervention: Optimize Anesthesia Recovery  Recent Flowsheet Documentation  Taken 9/16/2024 0900 by Amarilis Crespo RN  Safety Promotion/Fall Prevention:   safety round/check completed   room organization consistent   room door open   patient and family education   nonskid shoes/slippers when out of bed   clutter free environment maintained  Goal: Pain Control and Function  Outcome: Progressing  Intervention: Prevent or Manage Pain  Recent Flowsheet Documentation  Taken 9/16/2024 0752 by Amarilis Crespo RN  Pain Management Interventions: medication (see MAR)  Goal: Nausea and Vomiting Relief  Outcome: Progressing  Intervention: Prevent or Manage Nausea and Vomiting  Recent Flowsheet Documentation  Taken 9/16/2024 0900 by Amarilis Crespo RN  Nausea/Vomiting Interventions:  (denies) --  Goal: Effective Urinary Elimination  Outcome: Progressing  Goal: Effective Oxygenation and Ventilation  Outcome: Progressing

## 2024-09-16 NOTE — TELEPHONE ENCOUNTER
"Kendell Spencer has been having Upper Central Abdominal pain all day.  - Initially intermittent - \"off & on\"  - Continuous since ~4 pm  - Pain rated ~7.5-8/10  - Hx of Gall stones    ED advised - Paul Zhang RN  [facility]      Reason for Disposition   [1] SEVERE pain (e.g., excruciating) AND [2] present > 1 hour    Additional Information   Negative: SEVERE difficulty breathing (e.g., struggling for each breath, speaks in single words)   Negative: Shock suspected (e.g., cold/pale/clammy skin, too weak to stand, low BP, rapid pulse)   Negative: Difficult to awaken or acting confused (e.g., disoriented, slurred speech)   Negative: Passed out (i.e., lost consciousness, collapsed and was not responding)   Negative: Visible sweat on face or sweat dripping down face   Negative: Sounds like a life-threatening emergency to the triager    Protocols used: Abdominal Pain - Upper-A-AH    "

## 2024-09-16 NOTE — PLAN OF CARE
[facility]    ED Boarding Nurse Handoff Addendum Report:    Date/time: 9/16/2024, 1:38 AM    Activity Level: independent    Fall Risk: No    Active Infusions: flagyl    Current Meds Due:  ml/hr    Current care needs: pain control, surgical consult    Respiratory status: Room air    Vital signs (within last 30 minutes):    Vitals:    09/15/24 2212 09/16/24 0015   BP: 109/86 (!) 126/90   Pulse: 71    Resp: 20    Temp: 99.4  F (37.4  C)    TempSrc: Oral    SpO2: 98% 99%   Weight: 79.3 kg (174 lb 13.2 oz)        Focused assessment within last 30 minutes:    Pt A&Ox4. Denies pain at this time. Abx currently infusing. Pt voiding on own.     ED Boarding Nurse name: Bryan Gaspar RN

## 2024-09-16 NOTE — H&P
[facility]    Hospitalist History and Physical    Name: You Park    MRN: [id number]  YOB: 2000    Age: 24 year old  Date of Admission:  9/15/2024  Date of Service (when I saw the patient): 09/16/24    Assessment & Plan   You Park is a 24 year old male with past medical history significant for cholelithiasis, kidney stones, remote history of seizures seizure-free for 8+ years presented to the emergency room with 1 day history of epigastric pain.      Lab work showed transaminitis ALT > AST, elevated bilirubin.  Upper quadrant ultrasound shows acute cholecystitis and with sludge in gallbladder.  Admitted for acute cholecystitis    Acute cholecystitis  -N.p.o.  -IV fluids  -As needed pain meds  -Antiemetics  -Empiric antibiotics Ceftriaxone started in ED will continue  -General Surgery consult in a.m.    Clinically Significant Risk Factors Present on Admission         DVT Prophylaxis: Pneumatic Compression Devices  Code Status: Full Code    Disposition: Expected discharge in 1 to 2 days    Primary Care Physician   Physician No Ref-Primary    Chief Complaint   Epigastric pain 1 day    History is obtained from the patient    History of Present Illness   You Park is a 24 year old male with past medical history significant for cholelithiasis, kidney stones, remote history of seizures seizure-free for 8+ years presented to the emergency room with 1 day history of epigastric pain.      Patient notes that few months ago he had similar pain which improved on its own at that time was told that he has gallstones.  Pain started yesterday woke him up at night about 8 out of 10 in intensity felt like pressure in epigastric area spontaneously improved after several hours.  Then recurred again this time around it was not subsiding so came to the emergency room pain is anywhere from 6-8/10 in intensity.  Radiating to the right side of the abdomen mostly in the epigastric area  pressure-like pain very similar to the episode few months ago with gallstones.  No associated nausea or vomiting.  Pain does get worse when he tries to eat.  Had a BM with no impact on pain.  No cough fever or chills.  More than 10 point review of system was carried out was otherwise negative.    Lab work showed transaminitis ALT > AST, elevated bilirubin.  Upper quadrant ultrasound shows acute cholecystitis and with sludge in gallbladder.  Admitted for acute cholecystitis    Past Medical History    Past Medical History:   Diagnosis Date    Seizures (H)          Past Surgical History   No past surgical history on file.    Prior to Admission Medications   Prior to Admission Medications   Prescriptions Last Dose Informant Patient Reported? Taking?   ibuprofen (ADVIL/MOTRIN) 200 MG tablet   No No   Sig: Take 2 tablets (400 mg) by mouth every 6 hours as needed for pain   Patient not taking: Reported on 6/6/2024      Facility-Administered Medications: None     Allergies   No Known Allergies    Social History   Social History     Tobacco Use    Smoking status: Never     Passive exposure: Never    Smokeless tobacco: Never    Tobacco comments:     vapor smokes   Substance Use Topics    Alcohol use: Not on file     Social History     Social History Narrative    Not on file     Patient vapes, patient no use of alcohol once and Bluemel none recently.  Family History   I have reviewed this patient's family history and updated it with pertinent information if needed.   Family History   Problem Relation Age of Onset    No Known Problems Mother     No Known Problems Father    Family history significant for diabetes in mom.    Review of Systems   A Comprehensive greater than 10 system review of systems was carried out.  Pertinent positives and negatives are noted above.  Otherwise negative for contributory information.    Physical Exam   Temp: 99.4  F (37.4  C) Temp src: Oral BP: 109/86 Pulse: 71   Resp: 20 SpO2: 98 % O2 Device:  "None (Room air)    Vital Signs with Ranges  Temp:  [99.4  F (37.4  C)] 99.4  F (37.4  C)  Pulse:  [71] 71  Resp:  [20] 20  BP: (109)/(86) 109/86  SpO2:  [98 %] 98 %  174 lbs 13.2 oz    GEN:  Alert, oriented x 3, appears comfortable, no overt distress  HEENT:  Normocephalic/atraumatic, no scleral icterus, no nasal discharge, mouth moist.  CV:  Regular rate and rhythm, no murmur or JVD.  S1 + S2 noted, no S3 or S4.  LUNGS:  Clear to auscultation bilaterally without rales/rhonchi/wheezing/retractions.  Symmetric chest rise on inhalation noted.  ABD:  Active bowel sounds, soft, minimal right upper quadrant tenderness.  No rebound/guarding/rigidity.  EXT:  No edema.  No cyanosis.  No joint synovitis noted.  SKIN:  Dry to touch, no exanthems noted in the visualized areas.  NEURO:  Symmetric muscle strength, .  No new focal deficits appreciated.    Data   Data reviewed today:  I personally reviewed the right upper quadrant ultrasound image(s) showing as below positive for acute cholecystitis .    Recent Labs   Lab 09/15/24  2230   WBC 7.7   HGB 15.4   HCT 44.4   MCV 82        Recent Labs   Lab 09/15/24  2230      POTASSIUM 3.9   CHLORIDE 103   CO2 25   ANIONGAP 12   *   BUN 7.9   CR 1.03   GFRESTIMATED >90   SELAM 9.7     No results for input(s): \"SED\", \"CRP\" in the last 168 hours.  Recent Labs   Lab 09/15/24  2230   *     Recent Labs   Lab 09/15/24  2230   HGB 15.4     Recent Labs   Lab 09/15/24  2230   *   *   ALKPHOS 136   BILITOTAL 3.8*     No results for input(s): \"INR\" in the last 168 hours.  No results for input(s): \"LACT\" in the last 168 hours.  Recent Labs   Lab 09/15/24  2230   LIPASE 24     No results for input(s): \"CHOL\", \"HDL\", \"LDL\", \"TRIG\", \"CHOLHDLRATIO\" in the last 168 hours.  No results for input(s): \"TSH\" in the last 168 hours.  No results for input(s): \"TROPONIN\", \"TROPI\", \"TROPR\", \"TROPONINIS\" in the last 168 hours.    Invalid input(s): \"TROPT\", \"TROP\", " "\"TROPONINIES\", \"TNIH\"  No results for input(s): \"COLOR\", \"APPEARANCE\", \"URINEGLC\", \"URINEBILI\", \"URINEKETONE\", \"SG\", \"UBLD\", \"URINEPH\", \"PROTEIN\", \"UROBILINOGEN\", \"NITRITE\", \"LEUKEST\", \"RBCU\", \"WBCU\" in the last 168 hours.    Recent Results (from the past 24 hour(s))   US Abdomen Limited    Narrative    EXAM: US ABDOMEN LIMITED  LOCATION: [facility]  DATE: 9/16/2024    INDICATION: RUQ abdominal pain  COMPARISON: None.  TECHNIQUE: Limited abdominal ultrasound.    FINDINGS:    GALLBLADDER: Multiple stones and sludge within the gallbladder. Gallbladder wall upper limits of normal for thickness measuring 3.0 cm. No pericholecystic fluid. Positive sonographic Yeager sign.     BILE DUCTS: No biliary dilatation. The common duct measures 5.5 mm.    LIVER: Normal parenchyma with smooth contour. No focal mass. Normal direction of flow in the main portal vein.    RIGHT KIDNEY: No hydronephrosis.    PANCREAS: The visualized portions are normal.    No ascites.      Impression    IMPRESSION:  1.  Stones and sludge within the gallbladder with positive sonographic Yeager sign and gallbladder wall upper limits of normal. Findings are suspicious for acute cholecystitis.  2.  No biliary dilatation.  3.  Remainder of exam unremarkable. Abnormal findings requiring follow-up.          "

## 2024-09-16 NOTE — CONSULTS
General Surgery Consultation    You Park MRN# [id number]   Age: 24 year old YOB: 2000     Date of Admission:  9/15/2024    Reason for consult:            Abdominal pain, epigastric       Requesting physician:            Araceli Stallings PA-C                Assessment and Plan:   Assessment:   You Park is a 24 year old male with possible choledocholithiasis.     Comorbidities:   has a past medical history of Seizures (H).      Plan:   I have offered the patient a laparoscopic cholecystectomy with intraoperative cholangiogram if his labs are trending downwards. I have asked our GI colleagues to see him this morning to evaluate for possible choledocholithiasis.     We have discussed the indication, alternatives, risks and expected recovery.  Specifically we have discussed incisions, scarring, postoperative infections, anesthesia, bleeding, blood transfusion, open conversion, common bile duct injury, injury to intra-abdominal organs, adhesions leading to bowel obstruction, retained common bile duct stone, bile leak, DVT, PE, hernia, post cholecystectomy diarrhea, recovery, postoperative dietary restrictions and physical limitations.  We have discussed the recommended interventions and treatments for these complications.  All questions have been answered to the best of my ability.                 Chief Complaint:   Abdominal pain, epigastric     History is obtained from the patient.         History of Present Illness:   You Park is a 24 year old male who presents with epigastric region abdominal pain for the past 1 days.  The pain is constant.  He has had similar pain in the past on one occasion and has a known history of gallstones.  There is not an association with eating any type of food.  Negative for associated symptoms of jaundice or icterus. He  has not had pancreatitis in the past.              Past Medical History:    has a past medical history of Seizures (H).          Past  Surgical History:   No past surgical history on file.          Social History:     Social History     Tobacco Use    Smoking status: Never     Passive exposure: Never    Smokeless tobacco: Never    Tobacco comments:     vapor smokes   Substance Use Topics    Alcohol use: Not on file             Family History:   Family history reviewed and is not pertinent.         Allergies:   No Known Allergies          Medications:     Current Facility-Administered Medications   Medication Dose Route Frequency Provider Last Rate Last Admin    cefTRIAXone (ROCEPHIN) 2 g vial to attach to  ml bag for ADULTS or NS 50 ml bag for PEDS  2 g Intravenous Q24H Ernestina Dewey MD        HYDROmorphone (DILAUDID) injection 0.2 mg  0.2 mg Intravenous Q2H PRN Ernestina Dewey MD   0.2 mg at 09/16/24 0752    HYDROmorphone (DILAUDID) injection 0.4 mg  0.4 mg Intravenous Q2H PRN Ernestina Dewey MD        naloxone (NARCAN) injection 0.2 mg  0.2 mg Intravenous Q2 Min PRN Ernestina Dewey MD        Or    naloxone (NARCAN) injection 0.4 mg  0.4 mg Intravenous Q2 Min PRN Ernestina Dewey MD        Or    naloxone (NARCAN) injection 0.2 mg  0.2 mg Intramuscular Q2 Min PRN Ernestina Dewey MD        Or    naloxone (NARCAN) injection 0.4 mg  0.4 mg Intramuscular Q2 Min PRN Ernestina Dewey MD        ondansetron (ZOFRAN ODT) ODT tab 4 mg  4 mg Oral Q6H PRN Ernestina Dewey MD        Or    ondansetron (ZOFRAN) injection 4 mg  4 mg Intravenous Q6H PRN Ernestina Dewey MD        sodium chloride 0.9 % infusion   Intravenous Continuous Ernestina Dewey  mL/hr at 09/16/24 0239 New Bag at 09/16/24 0239     Current Facility-Administered Medications   Medication Dose Route Frequency Provider Last Rate Last Admin    cefTRIAXone (ROCEPHIN) 2 g vial to attach to  ml bag for ADULTS or NS 50 ml bag for PEDS  2 g Intravenous Q24H Ernestina Dewey MD                Review of Systems:   The 10 point  review of systems is negative other than noted in the HPI.          Physical Exam:   /49 (BP Location: Left arm)   Pulse 71   Temp 98  F (36.7  C) (Oral)   Resp 18   Wt 79.7 kg (175 lb 9.6 oz)   SpO2 99%   BMI 28.34 kg/m    General - Well developed, well nourished male in no apparent distress  HEENT:  Head normocephalic and atraumatic, pupils equal and round, conjunctivae clear, no scleral icterus, mucous membranes moist, external ears and nose normal  Neck: Supple without thyromegaly or masses  Lymphatic: No cervical, or supraclavicular lymphadenopathy  Lungs: Clear to auscultation bilaterally  Heart: regular rate and rhythm, no murmurs  Abdomen:   soft, flat, non-distended with mild tenderness noted in the epigastric region and Yeager's sign is absent. no masses palpated  Extremities: Warm without edema  Neurologic: nonfocal  Psychiatric: Mood and affect appropriate  Skin: Without lesions, rashes, or juandice         Data:     WBC -   Lab Results   Component Value Date    WBC 6.9 09/16/2024       HgB -   Lab Results   Component Value Date    HGB 13.5 09/16/2024       Plt-   Lab Results   Component Value Date     09/16/2024       Liver Function Studies -   Recent Labs   Lab Test 09/15/24  2230   PROTTOTAL 7.4   ALBUMIN 4.8   BILITOTAL 3.8*   ALKPHOS 136   *   *       Lipase-   Lab Results   Component Value Date    LIPASE 24 09/15/2024         Imaging:  All imaging studies reviewed by me.    Results for orders placed or performed during the hospital encounter of 09/15/24   US Abdomen Limited    Narrative    EXAM: US ABDOMEN LIMITED  LOCATION: [facility]  DATE: 9/16/2024    INDICATION: RUQ abdominal pain  COMPARISON: None.  TECHNIQUE: Limited abdominal ultrasound.    FINDINGS:    GALLBLADDER: Multiple stones and sludge within the gallbladder. Gallbladder wall upper limits of normal for thickness measuring 3.0 cm. No pericholecystic fluid. Positive sonographic  Yeager sign.     BILE DUCTS: No biliary dilatation. The common duct measures 5.5 mm.    LIVER: Normal parenchyma with smooth contour. No focal mass. Normal direction of flow in the main portal vein.    RIGHT KIDNEY: No hydronephrosis.    PANCREAS: The visualized portions are normal.    No ascites.      Impression    IMPRESSION:  1.  Stones and sludge within the gallbladder with positive sonographic Yeager sign and gallbladder wall upper limits of normal. Findings are suspicious for acute cholecystitis.  2.  No biliary dilatation.  3.  Remainder of exam unremarkable. Abnormal findings requiring follow-up.            Boston De Guzman MD

## 2024-09-16 NOTE — ED NOTES
[facility]  ED Nurse Handoff Report    ED Chief complaint: Abdominal Pain  . ED Diagnosis:   Final diagnoses:   None       Allergies: No Known Allergies    Code Status: Full Code    Activity level - Baseline/Home:  independent.  Activity Level - Current:   independent.   Lift room needed: No.   Bariatric: No   Needed: No   Isolation: No.   Infection: Not Applicable.     Respiratory status: Room air    Vital Signs (within 30 minutes):   Vitals:    09/15/24 2212   BP: 109/86   Pulse: 71   Resp: 20   Temp: 99.4  F (37.4  C)   TempSrc: Oral   SpO2: 98%   Weight: 79.3 kg (174 lb 13.2 oz)       Cardiac Rhythm:  ,      Pain level:    Patient confused: No.   Patient Falls Risk: mobility aid in reach.   Elimination Status: Has voided     Patient Report - Initial Complaint: abdominal pain .   Focused Assessment:  You Park is a 24 year old male with history of calculus of gallbladder and ureteral stone who presents to the ED for evaluation of abdominal pain. The patient reports acute onset epigastric abdominal pain this morning. He did eat [employer] today and the abdominal pain has gotten worse which is abnormal as he states the pain usually resolves in a few hours. Notable history of gallstones, seen 5/30/24 in the ED. He did not follow up with a surgeon after this. Endorses regular bowel movement and urination. No nausea, vomiting, or fever.   Physical Exam     General:Sitting up in bed  Eyes:               The pupils are equal and round                          Conjunctivae and sclerae are normal  ENT:                Atraumatic face  Neck:              Normal range of motion  CV:                  Regular rate, regular rhythm                           Skin warm and well perfused   Resp:              Non labored breathing on room air                          No tachypnea                          No cough heard                          Lungs clear bilaterally  GI:                    Abdomen is soft, there is no rigidity                          No distension                          No rebound tenderness                           No abdominal tenderness  MS:                  Normal muscular tone  Skin:               No rash or acute skin lesions noted  Neuro:            Awake, alert.                            Speech is normal and fluent.                          Face is symmetric.                           Moves all extremities equally  Psych:            Normal affect.  Appropriate interactions.         Abnormal Results:   Labs Ordered and Resulted from Time of ED Arrival to Time of ED Departure   COMPREHENSIVE METABOLIC PANEL - Abnormal       Result Value    Sodium 140      Potassium 3.9      Carbon Dioxide (CO2) 25      Anion Gap 12      Urea Nitrogen 7.9      Creatinine 1.03      GFR Estimate >90      Calcium 9.7      Chloride 103      Glucose 107 (*)     Alkaline Phosphatase 136       (*)      (*)     Protein Total 7.4      Albumin 4.8      Bilirubin Total 3.8 (*)    LIPASE - Normal    Lipase 24     CBC WITH PLATELETS AND DIFFERENTIAL    WBC Count 7.7      RBC Count 5.41      Hemoglobin 15.4      Hematocrit 44.4      MCV 82      MCH 28.5      MCHC 34.7      RDW 12.2      Platelet Count 272      % Neutrophils 75      % Lymphocytes 16      % Monocytes 7      % Eosinophils 1      % Basophils 1      % Immature Granulocytes 0      NRBCs per 100 WBC 0      Absolute Neutrophils 5.7      Absolute Lymphocytes 1.3      Absolute Monocytes 0.6      Absolute Eosinophils 0.1      Absolute Basophils 0.1      Absolute Immature Granulocytes 0.0      Absolute NRBCs 0.0          US Abdomen Limited    (Results Pending)       Treatments provided: see above see mar   Family Comments: none   OBS brochure/video discussed/provided to patient:  Yes  ED Medications:   Medications   sodium chloride 0.9% BOLUS 1,000 mL (1,000 mLs Intravenous $New Bag 9/15/24 6126)   HYDROmorphone (PF) (DILAUDID)  injection 0.5 mg (has no administration in time range)   ketorolac (TORADOL) injection 15 mg (15 mg Intravenous $Given 9/15/24 9932)       Drips infusing:  Yes  For the majority of the shift this patient was Green.   Interventions performed were nne .    Sepsis treatment initiated: No    Cares/treatment/interventions/medications to be completed following ED care: see above     ED Nurse Name: Tamiko Lilly RN  11:34 PM    RECEIVING UNIT ED HANDOFF REVIEW    Above ED Nurse Handoff Report was reviewed: Yes  Reviewed by: Desiree Valdez RN on September 16, 2024 at 1:38 AM   I Taiwo called the ED to inform them the note was read: Yes

## 2024-09-16 NOTE — PHARMACY-ADMISSION MEDICATION HISTORY
Pharmacist Admission Medication History    Admission medication history is complete. The information provided in this note is only as accurate as the sources available at the time of the update.    Information Source(s): Patient via in-person    Pertinent Information:      Changes made to PTA medication list:  Added: None  Deleted: None  Changed: None    Allergies reviewed with patient and updates made in EHR: yes    Medication History Completed By: Camille Harden RPH 9/16/2024 7:54 AM    No outpatient medications have been marked as taking for the 9/15/24 encounter (Hospital Encounter).

## 2024-09-16 NOTE — PLAN OF CARE
PRIMARY DIAGNOSIS: BILIARY COLIC/UNCOMPLICATED EARLY ACUTE CHOLECYSTITIS  OUTPATIENT/OBSERVATION GOALS TO BE MET BEFORE DISCHARGE:    1. Pain status: Pain free.  2. Stable vital signs and labs (if performed) at disposition: Yes.. Raffaele cardiac HR noted.  3. Tolerating adequate PO diet: No.. NPO  4. Successful cholecystectomy or clear follow up plan with General Surgery team if immediate surgery not performed awaiting MRI results.  5. ADLs back to baseline?  Yes  6. Activity and level of assistance: Ambulating independently.  7. Barriers to discharge noted Yes     Discharge Planner Nurse   Safe discharge environment identified: No  Barriers to discharge: Yes       Entered by: Chela Edwards RN 09/16/2024 3:42 PM     Please review provider order for any additional goals.   Nurse to notify provider when observation goals have been met and patient is ready for discharge.      Goal Outcome Evaluation:      Plan of Care Reviewed With: patient    Overall Patient Progress: no changeOverall Patient Progress: no change    Outcome Evaluation: PT a/o x4, no c/o of pain. BS faint. No c/o upon plapation. Just tender on abd when streatching pt states. IVF running at 100mL/hr. Indeo in room. Awaiting MRI results.      Problem: Adult Inpatient Plan of Care  Goal: Plan of Care Review  Description: The Plan of Care Review/Shift note should be completed every shift.  The Outcome Evaluation is a brief statement about your assessment that the patient is improving, declining, or no change.  This information will be displayed automatically on your shift  note.  Outcome: Progressing  Flowsheets (Taken 9/16/2024 1541)  Outcome Evaluation: PT a/o x4, no c/o of pain. BS faint. No c/o upon plapation. Just tender on abd when streatching pt states. IVF running at 100mL/hr. Indeo in room. Awaiting MRI results.  Plan of Care Reviewed With: patient  Overall Patient Progress: no change  Goal: Patient-Specific Goal (Individualized)  Description: You  "can add care plan individualizations to a care plan. Examples of Individualization might be:  \"Parent requests to be called daily at 9am for status\", \"I have a hard time hearing out of my right ear\", or \"Do not touch me to wake me up as it startles  me\".  Outcome: Progressing  Goal: Absence of Hospital-Acquired Illness or Injury  Outcome: Progressing  Intervention: Identify and Manage Fall Risk  Recent Flowsheet Documentation  Taken 9/16/2024 1500 by Chela Edwards RN  Safety Promotion/Fall Prevention: safety round/check completed  Intervention: Prevent Skin Injury  Recent Flowsheet Documentation  Taken 9/16/2024 1500 by Chela Edwards RN  Body Position: position changed independently  Device Skin Pressure Protection: adhesive use limited  Intervention: Prevent Infection  Recent Flowsheet Documentation  Taken 9/16/2024 1500 by Chela Edwards RN  Infection Prevention:   hand hygiene promoted   equipment surfaces disinfected  Goal: Optimal Comfort and Wellbeing  Outcome: Progressing  Goal: Readiness for Transition of Care  Outcome: Progressing     Problem: Pain Acute  Goal: Optimal Pain Control and Function  Outcome: Progressing  Intervention: Prevent or Manage Pain  Recent Flowsheet Documentation  Taken 9/16/2024 1500 by Chela Edwards RN  Medication Review/Management: medications reviewed     Problem: Cholecystectomy  Goal: Absence of Bleeding  Outcome: Progressing  Goal: Effective Bowel Elimination  Outcome: Progressing  Goal: Fluid and Electrolyte Balance  Outcome: Progressing  Goal: Absence of Infection Signs and Symptoms  Outcome: Progressing  Goal: Anesthesia/Sedation Recovery  Outcome: Progressing  Intervention: Optimize Anesthesia Recovery  Recent Flowsheet Documentation  Taken 9/16/2024 1500 by Chela Edwards RN  Safety Promotion/Fall Prevention: safety round/check completed  Goal: Pain Control and Function  Outcome: Progressing  Goal: Nausea and Vomiting Relief  Outcome: Progressing  Goal: " Effective Urinary Elimination  Outcome: Progressing  Goal: Effective Oxygenation and Ventilation  Outcome: Progressing

## 2024-09-16 NOTE — PLAN OF CARE
ROOM # 206-2    Living Situation (if not independent, order SW consult):  Facility name:  : [facility]    Activity level at baseline: Ind  Activity level on admit: Ind    Who will be transporting you at discharge: [facility]    Patient registered to observation; given Patient Bill of Rights; given the opportunity to ask questions about observation status and their plan of care.  Patient has been oriented to the observation room, bathroom and call light is in place.    Discussed discharge goals and expectations with patient/family.

## 2024-09-16 NOTE — PLAN OF CARE
PRIMARY DIAGNOSIS:   ACUTE CHOLECYSTITIS    OUTPATIENT/OBSERVATION GOALS TO BE MET BEFORE DISCHARGE:    1. Pain status: Improved-controlled with oral pain medications.  2. Stable vital signs and labs (if performed) at disposition: Yes  3. Tolerating adequate PO diet: Yes  4. Successful cholecystectomy or clear follow up plan with General Surgery team if immediate surgery not performed  Consult today  5. ADLs back to baseline?  Yes  6. Activity and level of assistance: Ambulating independently.  7. Barriers to discharge noted Yes     Discharge Planner Nurse   Safe discharge environment identified: Yes  Barriers to discharge: Yes       Entered by: Desiree Valdez RN 09/16/2024       Pt is A&Ox4. VSS. NPO.  Independent in room. NS Running @ 100 ml/hr.  General Surgery Consult in AM.     Please review provider order for any additional goals.   Nurse to notify provider when observation goals have been met and patient is ready for discharge.

## 2024-09-16 NOTE — ED PROVIDER NOTES
Emergency Department Note      History of Present Illness     Chief Complaint   Abdominal Pain      HPI   You Park is a 24 year old male with history of calculus of gallbladder and ureteral stone who presents to the ED for evaluation of abdominal pain. The patient reports acute onset epigastric abdominal pain this morning. He did eat [employer] today and the abdominal pain has gotten worse which is abnormal as he states the pain usually resolves in a few hours. Notable history of gallstones, he was seen 5/30/24 in the ED. He did not follow up with a surgeon after this. Endorses regular bowel movement and urination. No nausea, vomiting, or fever.     Independent Historian   None    Review of External Notes   Reviewed nurse triage note from earlier today -recommended going to the hospital    Past Medical History     Medical History and Problem List   Seizures   Calculus of gallbladder  Pulmonary nodules  Ureteral stone     Medications   The patient is not currently taking any prescribed medications.     Physical Exam     Patient Vitals for the past 24 hrs:   BP Temp Temp src Pulse Resp SpO2 Weight   09/16/24 0015 (!) 126/90 -- -- -- -- 99 % --   09/15/24 2212 109/86 99.4  F (37.4  C) Oral 71 20 98 % 79.3 kg (174 lb 13.2 oz)     Physical Exam    General: Sitting up in bed  Eyes:  The pupils are equal and round    Conjunctivae and sclerae are normal  ENT:    Atraumatic face  Neck:  Normal range of motion  CV:  Regular rate, regular rhythm     Skin warm and well perfused   Resp:  Non labored breathing on room air    No tachypnea    No cough heard    Lungs clear bilaterally  GI:  Abdomen is soft, there is no rigidity    No distension    No rebound tenderness     RUQ tenderness  MS:  Normal muscular tone  Skin:  No rash or acute skin lesions noted  Neuro:   Awake, alert.      Speech is normal and fluent.    Face is symmetric.     Moves all extremities equally  Psych: Normal affect.  Appropriate interactions.      Diagnostics     Lab Results   Labs Ordered and Resulted from Time of ED Arrival to Time of ED Departure   COMPREHENSIVE METABOLIC PANEL - Abnormal       Result Value    Sodium 140      Potassium 3.9      Carbon Dioxide (CO2) 25      Anion Gap 12      Urea Nitrogen 7.9      Creatinine 1.03      GFR Estimate >90      Calcium 9.7      Chloride 103      Glucose 107 (*)     Alkaline Phosphatase 136       (*)      (*)     Protein Total 7.4      Albumin 4.8      Bilirubin Total 3.8 (*)    BILIRUBIN DIRECT - Abnormal    Bilirubin Direct 2.11 (*)    LIPASE - Normal    Lipase 24     CBC WITH PLATELETS AND DIFFERENTIAL    WBC Count 7.7      RBC Count 5.41      Hemoglobin 15.4      Hematocrit 44.4      MCV 82      MCH 28.5      MCHC 34.7      RDW 12.2      Platelet Count 272      % Neutrophils 75      % Lymphocytes 16      % Monocytes 7      % Eosinophils 1      % Basophils 1      % Immature Granulocytes 0      NRBCs per 100 WBC 0      Absolute Neutrophils 5.7      Absolute Lymphocytes 1.3      Absolute Monocytes 0.6      Absolute Eosinophils 0.1      Absolute Basophils 0.1      Absolute Immature Granulocytes 0.0      Absolute NRBCs 0.0       Imaging   US Abdomen Limited   Final Result   IMPRESSION:   1.  Stones and sludge within the gallbladder with positive sonographic Yeager sign and gallbladder wall upper limits of normal. Findings are suspicious for acute cholecystitis.   2.  No biliary dilatation.   3.  Remainder of exam unremarkable. Abnormal findings requiring follow-up.           ED Course      Medications Administered   Medications   HYDROmorphone (PF) (DILAUDID) injection 0.5 mg (0.5 mg Intravenous $Given 9/16/24 0008)   cefTRIAXone (ROCEPHIN) 2 g vial to attach to  ml bag for ADULTS or NS 50 ml bag for PEDS (2 g Intravenous $New Bag 9/16/24 0101)   metroNIDAZOLE (FLAGYL) infusion 500 mg (has no administration in time range)   ketorolac (TORADOL) injection 15 mg (15 mg Intravenous $Given  9/15/24 2256)   sodium chloride 0.9% BOLUS 1,000 mL (0 mLs Intravenous Stopped 9/16/24 0004)     Procedures   Procedures     Discussion of Management   Admitting Hospitalist, Dr. Dewey    ED Course   ED Course as of 09/16/24 0111   Sun Sep 15, 2024   2249 I obtained history and examined the patient as noted above.    Mon Sep 16, 2024   0026 I rechecked the patient and explained findings.    0041 I spoke with Dr. Dewey, Hospitalist, regarding the patient's history and presentation in the emergency department today.         Additional Documentation  None    Medical Decision Making / Diagnosis     MDM   You Park is a 24 year old male who presented to the emergency department with abdominal pain.  Patient with right upper quadrant tenderness and concern for gallbladder pathology.  LFTs are elevated.  Ultrasound confirms cholecystitis.  With LFTs abnormal, concern for choledocholithiasis though bile duct is normal size on ultrasound.  Patient given antibiotics.  Patient looks well no indication for emergent surgical consult.  Discussed patient with hospitalist for admission.    Disposition   The patient was admitted to the hospital.     Diagnosis     ICD-10-CM    1. Choledocholithiasis  K80.50       2. Acute cholecystitis  K81.0          Scribe Disclosure:  I, Maria Antonia Vic, am serving as a scribe at 11:14 PM on 9/15/2024 to document services personally performed by Shameka Cordova MD based on my observations and the provider's statements to me.        Shameka Cordova MD  09/16/24 0127

## 2024-09-16 NOTE — PROGRESS NOTES
[facility]    Medicine Progress Note - Hospitalist Service    Date of Admission:  9/15/2024    Assessment & Plan   You Park is a 24 year old male with past medical history significant for cholelithiasis, kidney stones, remote history of seizures seizure-free for 8+ years presented to the emergency room with 1 day history of epigastric pain.      Lab work showed transaminitis ALT > AST, elevated bilirubin.  Upper quadrant ultrasound shows acute cholecystitis and with sludge in gallbladder. Elevated LFTs concerning for possible CBD obstruction.        Biliary colic, possible acute cholecystitis  Elevated LFTs concerning for possible CBD obstruction   - Pain controlled at this time  - No fever or chills, no clinical e/o cholangitis   - Appreciate GI eval, plan for MRCP  - ERCP vs proceeding to lap jaret pending result   - Cont IVF, NPO   - Cont abx for now        Diet: NPO for Medical/Clinical Reasons Except for: Ice Chips    DVT Prophylaxis: Low Risk/Ambulatory with no VTE prophylaxis indicated  Craig Catheter: Not present  Lines: None     Cardiac Monitoring: None  Code Status: Full Code      Clinically Significant Risk Factors Present on Admission                                           Disposition Plan     Medically Ready for Discharge: Anticipated Tomorrow or the following day pending MRCP results     Araceli Stallings PA-C  Hospitalist Service  [facility]  Securely message with [employer] (more info)  Text page via [employer] Paging/Directory   ______________________________________________________________________    Interval History   Pain controlled.   No fever or chills.     Physical Exam   Vital Signs: Temp: 98  F (36.7  C) Temp src: Oral BP: 110/49 Pulse: 71   Resp: 18 SpO2: 99 % O2 Device: None (Room air)    Weight: 175 lbs 9.6 oz    GENERAL:  Comfortable.  PSYCH: pleasant, oriented, No acute distress.  HEENT:  PERRLA. Normal conjunctiva, normal hearing, nasal  mucosa and Oropharynx are normal.  NECK:  Supple, no neck vein distention, adenopathy or bruits, normal thyroid.  HEART:  Normal S1, S2 with no murmur, no pericardial rub, gallops or S3 or S4.  LUNGS:  Clear to auscultation, normal Respiratory effort. No wheezing, rales or ronchi.  ABDOMEN:  Soft, no hepatosplenomegaly, normal bowel sounds. Non-tender, non distended.   EXTREMITIES:  No pedal edema, +2 pulses bilateral and equal.  SKIN:  Dry to touch, No rash, wound or ulcerations.  NEUROLOGIC:  CN 2-12 intact, BL 5/5 symmetric upper and lower extremity strength, sensation is intact with no focal deficits.   Medical Decision Making       45 MINUTES SPENT BY ME on the date of service doing chart review, history, exam, documentation & further activities per the note.      Data     I have personally reviewed the following data over the past 24 hrs:    6.9  \   13.5   / 224     140 103 7.9 /  107 (H)   3.9 25 1.03 \     ALT: 456 (H) AST: 237 (H) AP: 147 TBILI: 3.6 (H)   ALB: 4.0 TOT PROTEIN: 6.0 (L) LIPASE: 24       Imaging results reviewed over the past 24 hrs:   Recent Results (from the past 24 hour(s))   US Abdomen Limited    Narrative    EXAM: US ABDOMEN LIMITED  LOCATION: [facility]  DATE: 9/16/2024    INDICATION: RUQ abdominal pain  COMPARISON: None.  TECHNIQUE: Limited abdominal ultrasound.    FINDINGS:    GALLBLADDER: Multiple stones and sludge within the gallbladder. Gallbladder wall upper limits of normal for thickness measuring 3.0 cm. No pericholecystic fluid. Positive sonographic Yeager sign.     BILE DUCTS: No biliary dilatation. The common duct measures 5.5 mm.    LIVER: Normal parenchyma with smooth contour. No focal mass. Normal direction of flow in the main portal vein.    RIGHT KIDNEY: No hydronephrosis.    PANCREAS: The visualized portions are normal.    No ascites.      Impression    IMPRESSION:  1.  Stones and sludge within the gallbladder with positive sonographic Yeager sign  and gallbladder wall upper limits of normal. Findings are suspicious for acute cholecystitis.  2.  No biliary dilatation.  3.  Remainder of exam unremarkable. Abnormal findings requiring follow-up.     XR Eye Foreign Body    Narrative    XR EYE FOREIGN BODY 9/16/2024 10:58 AM     HISTORY: screen for metal prior to MRI    COMPARISON: None.       Impression    IMPRESSION: No radiodense foreign body identified in the region of the  orbits.     SYLVAIN PINEDO MD         SYSTEM ID:  [id number]

## 2024-09-16 NOTE — CONSULTS
GASTROENTEROLOGY CONSULTATION      You Park  [address]   [place] [address]  24 year old male     Admission Date/Time: 9/15/2024  Primary Care Provider: No Ref-Primary, Physician  Referring / Attending Physician:  Araceli Stallings PA-C      We were asked to see the patient in consultation by Araceli Stallings PA-C for evaluation of epigastric abdominal pain.        HPI:  You Park is a 24 year old male with medical history of kidney stones, who presented to the emergency room with 24 hours of epigastric pain.    Patient reports sudden onset of severe epigastric pain with radiation across his upper abdomen that started after eating fast food.  The abdominal discomfort did improve enough for him to go to sleep.  Unfortunately the pain woke him from sleep he decided to come to the emergency room.  He thinks he had a similar epigastric pain a few months ago.  He was not sure if it was related to kidney stones.  When he tries to eat the pain does worsen.  No fever no chills.  Some nausea but no vomiting.  No recent alcohol use.  He is not smoking.    Abdominal ultrasound shows acute cholecystitis with stones and sludge in the gallbladder.  Positive Yeager sign and gallbladder wall upper limits of normal.  Findings suspicious for acute cholecystitis.  No biliary dilation.  , , direct bilirubin 2.11, total bilirubin 3.8.  LFTs this morning are not significantly improved.  Alkaline phosphatase 147, , , direct bilirubin 2.55, total bilirubin 3.6.  No leukocytosis.  No fever.       PAST MEDICAL HISTORY:  Patient Active Problem List    Diagnosis Date Noted    Acute cholecystitis 09/16/2024     Priority: Medium    Choledocholithiasis 09/16/2024     Priority: Medium          ROS: A comprehensive ten point review of systems was negative aside from those in mentioned in the HPI.       MEDICATIONS:   Prior to Admission medications    Not on File        ALLERGIES: No Known Allergies      SOCIAL HISTORY:  Social History     Tobacco Use    Smoking status: Never     Passive exposure: Never    Smokeless tobacco: Never    Tobacco comments:     vapor smokes   Vaping Use    Vaping status: Every Day    Substances: Nicotine    Devices: Refillable tank        FAMILY HISTORY:  Family History   Problem Relation Age of Onset    No Known Problems Mother     No Known Problems Father         PHYSICAL EXAM:     /49 (BP Location: Left arm)   Pulse 71   Temp 98  F (36.7  C) (Oral)   Resp 18   Wt 79.7 kg (175 lb 9.6 oz)   SpO2 99%   BMI 28.34 kg/m       PHYSICAL EXAM:  GENERAL: No acute distress  SKIN: no suspicious lesions, rashes, jaundice  HEAD: Normocephalic. Atraumatic.  NECK: Neck supple. No adenopathy.   EYES: No scleral icterus  GASTROINTESTINAL: soft, minimal epigastric tenderness, non distended, no guarding/rebound  JOINT/EXTREMITIES:  no gross deformities noted, normal muscle tone  NEURO: CN 2-12 grossly intact, no focal deficits  PSYCH: Normal affect       ADDITIONAL COMMENTS:   I reviewed the patient's new clinical lab test results.     Recent Labs   Lab 09/16/24  0719 09/15/24  2230   WBC 6.9 7.7   RBC 4.77 5.41   HGB 13.5 15.4   HCT 40.4 44.4   MCV 85 82   MCH 28.3 28.5   MCHC 33.4 34.7   RDW 12.3 12.2    272     Recent Labs   Lab Test 09/15/24  2230 05/30/24  2329 03/01/19  1017   POTASSIUM 3.9 3.6 3.9   CHLORIDE 103 102 108   CO2 25 23 26   BUN 7.9 18.5 14   ANIONGAP 12 14 5     Recent Labs   Lab Test 09/16/24  0719 09/15/24  2230 05/31/24  0132 05/30/24 2329 03/01/19  0917   ALBUMIN 4.0 4.8  --  4.6  --    BILITOTAL 3.6* 3.8*  --  1.0  --    * 529*  --  31  --    * 357*  --  22  --    PROTEIN  --   --  Negative  --  Negative   LIPASE  --  24  --  25  --        Recent Labs   Lab 09/15/24  2230   LIPASE 24        IMAGING / ENDOSCOPY     Recent Results (from the past 24 hour(s))   US Abdomen Limited    Narrative    EXAM: US ABDOMEN LIMITED  LOCATION: [facility]  DATE: 9/16/2024    INDICATION: RUQ abdominal pain  COMPARISON: None.  TECHNIQUE: Limited abdominal ultrasound.    FINDINGS:    GALLBLADDER: Multiple stones and sludge within the gallbladder. Gallbladder wall upper limits of normal for thickness measuring 3.0 cm. No pericholecystic fluid. Positive sonographic Yeager sign.     BILE DUCTS: No biliary dilatation. The common duct measures 5.5 mm.    LIVER: Normal parenchyma with smooth contour. No focal mass. Normal direction of flow in the main portal vein.    RIGHT KIDNEY: No hydronephrosis.    PANCREAS: The visualized portions are normal.    No ascites.      Impression    IMPRESSION:  1.  Stones and sludge within the gallbladder with positive sonographic Yeager sign and gallbladder wall upper limits of normal. Findings are suspicious for acute cholecystitis.  2.  No biliary dilatation.  3.  Remainder of exam unremarkable. Abnormal findings requiring follow-up.           CONSULTATION ASSESSMENT AND PLAN:    You Park is a 24 year old with medical history of kidney stones, who presented to the emergency room with 24 hours of epigastric pain found to have elevated LFTs with ultrasound evidence of stones and sludge within the gallbladder, findings suspicious for acute cholecystitis.    1.  Epigastric abdominal pain: Sudden onset of abdominal pain made worse by eating.  Ultrasound with evidence of stones and sludge in the gallbladder and concern for acute cholecystitis.  Total bilirubin elevated at 3.6, transaminases between 200-500, alkaline phosphatase normal.  Lipase is normal.  Abdominal pain is improving however he is requiring IV pain medication.  Hemodynamically stable without leukocytosis and afebrile.    -- Plan for MRCP this morning.  If positive for choledocholithiasis would proceed with ERCP.  If no evidence of obstructing stones, would suggest cholecystectomy.  -- General Surgery has been consulted  -- IV fluids, n.p.o., pain  control      I discussed the patient plan with Dr. Hollingsworth, GI staff physician. Thank you for asking us to participate in the care of this patient.     70 min of total time was spent providing patient care, including patient evaluation, reviewing documentation/ test results, and .     Nelida Matos PA-C  [facility] ( [facility])

## 2024-09-16 NOTE — ED TRIAGE NOTES
Pt arrives from home due to abdominal pain the began this AM. Pt reports pain decreased, but since 1600 pain has gotten worse and pt has taken Asprin @ 2100 and taken Gas-X and reported little improvement. Pt denies N/V/D. Pt is VSS.

## 2024-09-17 ENCOUNTER — APPOINTMENT (OUTPATIENT)
Dept: GENERAL RADIOLOGY | Facility: CLINIC | Age: 24
End: 2024-09-17
Attending: SURGERY
Payer: COMMERCIAL

## 2024-09-17 ENCOUNTER — ANESTHESIA (OUTPATIENT)
Dept: SURGERY | Facility: CLINIC | Age: 24
End: 2024-09-17
Payer: COMMERCIAL

## 2024-09-17 ENCOUNTER — ANESTHESIA EVENT (OUTPATIENT)
Dept: SURGERY | Facility: CLINIC | Age: 24
End: 2024-09-17
Payer: COMMERCIAL

## 2024-09-17 VITALS
TEMPERATURE: 97.8 F | RESPIRATION RATE: 16 BRPM | WEIGHT: 175.6 LBS | OXYGEN SATURATION: 97 % | SYSTOLIC BLOOD PRESSURE: 122 MMHG | HEART RATE: 55 BPM | BODY MASS INDEX: 28.34 KG/M2 | DIASTOLIC BLOOD PRESSURE: 64 MMHG

## 2024-09-17 LAB
ALBUMIN SERPL BCG-MCNC: 3.5 G/DL (ref 3.5–5.2)
ALP SERPL-CCNC: 185 U/L (ref 40–150)
ALT SERPL W P-5'-P-CCNC: 323 U/L (ref 0–70)
ANION GAP SERPL CALCULATED.3IONS-SCNC: 12 MMOL/L (ref 7–15)
AST SERPL W P-5'-P-CCNC: 91 U/L (ref 0–45)
BILIRUB SERPL-MCNC: 1.8 MG/DL
BUN SERPL-MCNC: 7.2 MG/DL (ref 6–20)
CALCIUM SERPL-MCNC: 8.5 MG/DL (ref 8.8–10.4)
CHLORIDE SERPL-SCNC: 107 MMOL/L (ref 98–107)
CREAT SERPL-MCNC: 0.83 MG/DL (ref 0.67–1.17)
EGFRCR SERPLBLD CKD-EPI 2021: >90 ML/MIN/1.73M2
ERYTHROCYTE [DISTWIDTH] IN BLOOD BY AUTOMATED COUNT: 12.3 % (ref 10–15)
GLUCOSE SERPL-MCNC: 80 MG/DL (ref 70–99)
HCO3 SERPL-SCNC: 19 MMOL/L (ref 22–29)
HCT VFR BLD AUTO: 39.4 % (ref 40–53)
HGB BLD-MCNC: 13.5 G/DL (ref 13.3–17.7)
MCH RBC QN AUTO: 28.9 PG (ref 26.5–33)
MCHC RBC AUTO-ENTMCNC: 34.3 G/DL (ref 31.5–36.5)
MCV RBC AUTO: 84 FL (ref 78–100)
PLATELET # BLD AUTO: 192 10E3/UL (ref 150–450)
POTASSIUM SERPL-SCNC: 3.5 MMOL/L (ref 3.4–5.3)
PROT SERPL-MCNC: 5.7 G/DL (ref 6.4–8.3)
RBC # BLD AUTO: 4.67 10E6/UL (ref 4.4–5.9)
SODIUM SERPL-SCNC: 138 MMOL/L (ref 135–145)
WBC # BLD AUTO: 6.7 10E3/UL (ref 4–11)

## 2024-09-17 PROCEDURE — 99207 PR NO BILLABLE SERVICE THIS VISIT: CPT | Performed by: PHYSICIAN ASSISTANT

## 2024-09-17 PROCEDURE — 80053 COMPREHEN METABOLIC PANEL: CPT | Performed by: PHYSICIAN ASSISTANT

## 2024-09-17 PROCEDURE — BF50200 OTHER IMAGING OF BILE DUCTS USING FLUORESCING AGENT, INDOCYANINE GREEN DYE, INTRAOPERATIVE: ICD-10-PCS | Performed by: SURGERY

## 2024-09-17 PROCEDURE — 99238 HOSP IP/OBS DSCHRG MGMT 30/<: CPT | Performed by: PHYSICIAN ASSISTANT

## 2024-09-17 PROCEDURE — 258N000001 HC RX 258: Performed by: SURGERY

## 2024-09-17 PROCEDURE — 250N000013 HC RX MED GY IP 250 OP 250 PS 637: Performed by: SURGERY

## 2024-09-17 PROCEDURE — 258N000003 HC RX IP 258 OP 636: Performed by: ANESTHESIOLOGY

## 2024-09-17 PROCEDURE — 999N000179 XR SURGERY CARM FLUORO LESS THAN 5 MIN W STILLS: Mod: TC

## 2024-09-17 PROCEDURE — 36415 COLL VENOUS BLD VENIPUNCTURE: CPT | Performed by: PHYSICIAN ASSISTANT

## 2024-09-17 PROCEDURE — 710N000012 HC RECOVERY PHASE 2, PER MINUTE: Performed by: SURGERY

## 2024-09-17 PROCEDURE — 47563 LAPARO CHOLECYSTECTOMY/GRAPH: CPT | Performed by: SURGERY

## 2024-09-17 PROCEDURE — 255N000002 HC RX 255 OP 636: Performed by: SURGERY

## 2024-09-17 PROCEDURE — 250N000009 HC RX 250: Performed by: NURSE ANESTHETIST, CERTIFIED REGISTERED

## 2024-09-17 PROCEDURE — 250N000025 HC SEVOFLURANE, PER MIN: Performed by: SURGERY

## 2024-09-17 PROCEDURE — 250N000009 HC RX 250: Performed by: SURGERY

## 2024-09-17 PROCEDURE — 47563 LAPARO CHOLECYSTECTOMY/GRAPH: CPT | Mod: AS | Performed by: PHYSICIAN ASSISTANT

## 2024-09-17 PROCEDURE — 999N000141 HC STATISTIC PRE-PROCEDURE NURSING ASSESSMENT: Performed by: SURGERY

## 2024-09-17 PROCEDURE — 250N000011 HC RX IP 250 OP 636: Performed by: SURGERY

## 2024-09-17 PROCEDURE — 250N000011 HC RX IP 250 OP 636: Performed by: NURSE ANESTHETIST, CERTIFIED REGISTERED

## 2024-09-17 PROCEDURE — 360N000083 HC SURGERY LEVEL 3 W/ FLUORO, PER MIN: Performed by: SURGERY

## 2024-09-17 PROCEDURE — 272N000001 HC OR GENERAL SUPPLY STERILE: Performed by: SURGERY

## 2024-09-17 PROCEDURE — 258N000003 HC RX IP 258 OP 636: Performed by: NURSE ANESTHETIST, CERTIFIED REGISTERED

## 2024-09-17 PROCEDURE — 120N000001 HC R&B MED SURG/OB

## 2024-09-17 PROCEDURE — 88304 TISSUE EXAM BY PATHOLOGIST: CPT | Mod: TC | Performed by: SURGERY

## 2024-09-17 PROCEDURE — 258N000003 HC RX IP 258 OP 636: Performed by: INTERNAL MEDICINE

## 2024-09-17 PROCEDURE — 370N000017 HC ANESTHESIA TECHNICAL FEE, PER MIN: Performed by: SURGERY

## 2024-09-17 PROCEDURE — 88304 TISSUE EXAM BY PATHOLOGIST: CPT | Mod: 26 | Performed by: PATHOLOGY

## 2024-09-17 PROCEDURE — 85027 COMPLETE CBC AUTOMATED: CPT | Performed by: PHYSICIAN ASSISTANT

## 2024-09-17 PROCEDURE — G0378 HOSPITAL OBSERVATION PER HR: HCPCS

## 2024-09-17 PROCEDURE — 0FT44ZZ RESECTION OF GALLBLADDER, PERCUTANEOUS ENDOSCOPIC APPROACH: ICD-10-PCS | Performed by: SURGERY

## 2024-09-17 PROCEDURE — 710N000009 HC RECOVERY PHASE 1, LEVEL 1, PER MIN: Performed by: SURGERY

## 2024-09-17 RX ORDER — ONDANSETRON 2 MG/ML
INJECTION INTRAMUSCULAR; INTRAVENOUS PRN
Status: DISCONTINUED | OUTPATIENT
Start: 2024-09-17 | End: 2024-09-17

## 2024-09-17 RX ORDER — INDOCYANINE GREEN AND WATER 25 MG
2.5 KIT INJECTION ONCE
Status: COMPLETED | OUTPATIENT
Start: 2024-09-17 | End: 2024-09-17

## 2024-09-17 RX ORDER — FENTANYL CITRATE 50 UG/ML
25 INJECTION, SOLUTION INTRAMUSCULAR; INTRAVENOUS EVERY 5 MIN PRN
Status: DISCONTINUED | OUTPATIENT
Start: 2024-09-17 | End: 2024-09-17 | Stop reason: HOSPADM

## 2024-09-17 RX ORDER — ONDANSETRON 4 MG/1
4 TABLET, ORALLY DISINTEGRATING ORAL EVERY 30 MIN PRN
Status: DISCONTINUED | OUTPATIENT
Start: 2024-09-17 | End: 2024-09-17 | Stop reason: HOSPADM

## 2024-09-17 RX ORDER — SODIUM CHLORIDE, SODIUM LACTATE, POTASSIUM CHLORIDE, CALCIUM CHLORIDE 600; 310; 30; 20 MG/100ML; MG/100ML; MG/100ML; MG/100ML
INJECTION, SOLUTION INTRAVENOUS CONTINUOUS PRN
Status: DISCONTINUED | OUTPATIENT
Start: 2024-09-17 | End: 2024-09-17

## 2024-09-17 RX ORDER — HYDROCODONE BITARTRATE AND ACETAMINOPHEN 5; 325 MG/1; MG/1
1-2 TABLET ORAL EVERY 4 HOURS PRN
Qty: 10 TABLET | Refills: 0 | Status: SHIPPED | OUTPATIENT
Start: 2024-09-17

## 2024-09-17 RX ORDER — CEFAZOLIN SODIUM/WATER 2 G/20 ML
2 SYRINGE (ML) INTRAVENOUS SEE ADMIN INSTRUCTIONS
Status: DISCONTINUED | OUTPATIENT
Start: 2024-09-17 | End: 2024-09-17 | Stop reason: HOSPADM

## 2024-09-17 RX ORDER — ONDANSETRON 2 MG/ML
4 INJECTION INTRAMUSCULAR; INTRAVENOUS EVERY 30 MIN PRN
Status: DISCONTINUED | OUTPATIENT
Start: 2024-09-17 | End: 2024-09-17 | Stop reason: HOSPADM

## 2024-09-17 RX ORDER — GLYCOPYRROLATE 0.2 MG/ML
INJECTION, SOLUTION INTRAMUSCULAR; INTRAVENOUS PRN
Status: DISCONTINUED | OUTPATIENT
Start: 2024-09-17 | End: 2024-09-17

## 2024-09-17 RX ORDER — CEFAZOLIN SODIUM/WATER 2 G/20 ML
2 SYRINGE (ML) INTRAVENOUS
Status: COMPLETED | OUTPATIENT
Start: 2024-09-17 | End: 2024-09-17

## 2024-09-17 RX ORDER — SODIUM CHLORIDE, SODIUM LACTATE, POTASSIUM CHLORIDE, CALCIUM CHLORIDE 600; 310; 30; 20 MG/100ML; MG/100ML; MG/100ML; MG/100ML
INJECTION, SOLUTION INTRAVENOUS CONTINUOUS
Status: DISCONTINUED | OUTPATIENT
Start: 2024-09-17 | End: 2024-09-17 | Stop reason: HOSPADM

## 2024-09-17 RX ORDER — PROPOFOL 10 MG/ML
INJECTION, EMULSION INTRAVENOUS PRN
Status: DISCONTINUED | OUTPATIENT
Start: 2024-09-17 | End: 2024-09-17

## 2024-09-17 RX ORDER — FENTANYL CITRATE 50 UG/ML
INJECTION, SOLUTION INTRAMUSCULAR; INTRAVENOUS PRN
Status: DISCONTINUED | OUTPATIENT
Start: 2024-09-17 | End: 2024-09-17

## 2024-09-17 RX ORDER — FENTANYL CITRATE 50 UG/ML
50 INJECTION, SOLUTION INTRAMUSCULAR; INTRAVENOUS EVERY 5 MIN PRN
Status: DISCONTINUED | OUTPATIENT
Start: 2024-09-17 | End: 2024-09-17 | Stop reason: HOSPADM

## 2024-09-17 RX ORDER — HYDROMORPHONE HCL IN WATER/PF 6 MG/30 ML
0.2 PATIENT CONTROLLED ANALGESIA SYRINGE INTRAVENOUS EVERY 5 MIN PRN
Status: DISCONTINUED | OUTPATIENT
Start: 2024-09-17 | End: 2024-09-17 | Stop reason: HOSPADM

## 2024-09-17 RX ORDER — LIDOCAINE HYDROCHLORIDE 20 MG/ML
INJECTION, SOLUTION INFILTRATION; PERINEURAL PRN
Status: DISCONTINUED | OUTPATIENT
Start: 2024-09-17 | End: 2024-09-17

## 2024-09-17 RX ORDER — HYDROCODONE BITARTRATE AND ACETAMINOPHEN 5; 325 MG/1; MG/1
1 TABLET ORAL
Status: COMPLETED | OUTPATIENT
Start: 2024-09-17 | End: 2024-09-17

## 2024-09-17 RX ORDER — NALOXONE HYDROCHLORIDE 0.4 MG/ML
0.1 INJECTION, SOLUTION INTRAMUSCULAR; INTRAVENOUS; SUBCUTANEOUS
Status: DISCONTINUED | OUTPATIENT
Start: 2024-09-17 | End: 2024-09-17 | Stop reason: HOSPADM

## 2024-09-17 RX ORDER — BUPIVACAINE HYDROCHLORIDE 5 MG/ML
INJECTION, SOLUTION EPIDURAL; INTRACAUDAL PRN
Status: DISCONTINUED | OUTPATIENT
Start: 2024-09-17 | End: 2024-09-17 | Stop reason: HOSPADM

## 2024-09-17 RX ORDER — DEXAMETHASONE SODIUM PHOSPHATE 4 MG/ML
INJECTION, SOLUTION INTRA-ARTICULAR; INTRALESIONAL; INTRAMUSCULAR; INTRAVENOUS; SOFT TISSUE PRN
Status: DISCONTINUED | OUTPATIENT
Start: 2024-09-17 | End: 2024-09-17

## 2024-09-17 RX ORDER — DEXAMETHASONE SODIUM PHOSPHATE 4 MG/ML
4 INJECTION, SOLUTION INTRA-ARTICULAR; INTRALESIONAL; INTRAMUSCULAR; INTRAVENOUS; SOFT TISSUE
Status: DISCONTINUED | OUTPATIENT
Start: 2024-09-17 | End: 2024-09-17 | Stop reason: HOSPADM

## 2024-09-17 RX ORDER — HYDROMORPHONE HCL IN WATER/PF 6 MG/30 ML
0.4 PATIENT CONTROLLED ANALGESIA SYRINGE INTRAVENOUS EVERY 5 MIN PRN
Status: DISCONTINUED | OUTPATIENT
Start: 2024-09-17 | End: 2024-09-17 | Stop reason: HOSPADM

## 2024-09-17 RX ADMIN — INDOCYANINE GREEN AND WATER 2.5 MG: KIT at 14:49

## 2024-09-17 RX ADMIN — HYDROCODONE BITARTRATE AND ACETAMINOPHEN 1 TABLET: 5; 325 TABLET ORAL at 17:11

## 2024-09-17 RX ADMIN — ONDANSETRON 4 MG: 2 INJECTION INTRAMUSCULAR; INTRAVENOUS at 16:17

## 2024-09-17 RX ADMIN — DEXAMETHASONE SODIUM PHOSPHATE 8 MG: 4 INJECTION, SOLUTION INTRA-ARTICULAR; INTRALESIONAL; INTRAMUSCULAR; INTRAVENOUS; SOFT TISSUE at 15:38

## 2024-09-17 RX ADMIN — Medication 2 G: at 15:35

## 2024-09-17 RX ADMIN — SODIUM CHLORIDE, POTASSIUM CHLORIDE, SODIUM LACTATE AND CALCIUM CHLORIDE: 600; 310; 30; 20 INJECTION, SOLUTION INTRAVENOUS at 15:11

## 2024-09-17 RX ADMIN — SUGAMMADEX 200 MG: 100 INJECTION, SOLUTION INTRAVENOUS at 16:29

## 2024-09-17 RX ADMIN — LIDOCAINE HYDROCHLORIDE 30 MG: 20 INJECTION, SOLUTION INFILTRATION; PERINEURAL at 15:38

## 2024-09-17 RX ADMIN — PROPOFOL 200 MG: 10 INJECTION, EMULSION INTRAVENOUS at 15:38

## 2024-09-17 RX ADMIN — HYDROMORPHONE HYDROCHLORIDE 1 MG: 1 INJECTION, SOLUTION INTRAMUSCULAR; INTRAVENOUS; SUBCUTANEOUS at 15:50

## 2024-09-17 RX ADMIN — FENTANYL CITRATE 100 MCG: 50 INJECTION INTRAMUSCULAR; INTRAVENOUS at 15:38

## 2024-09-17 RX ADMIN — ROCURONIUM BROMIDE 50 MG: 50 INJECTION, SOLUTION INTRAVENOUS at 15:38

## 2024-09-17 RX ADMIN — SODIUM CHLORIDE: 9 INJECTION, SOLUTION INTRAVENOUS at 08:17

## 2024-09-17 RX ADMIN — SODIUM CHLORIDE, POTASSIUM CHLORIDE, SODIUM LACTATE AND CALCIUM CHLORIDE: 600; 310; 30; 20 INJECTION, SOLUTION INTRAVENOUS at 15:18

## 2024-09-17 RX ADMIN — MIDAZOLAM 2 MG: 1 INJECTION INTRAMUSCULAR; INTRAVENOUS at 15:34

## 2024-09-17 RX ADMIN — GLYCOPYRROLATE 0.2 MG: 0.2 INJECTION, SOLUTION INTRAMUSCULAR; INTRAVENOUS at 15:38

## 2024-09-17 ASSESSMENT — ACTIVITIES OF DAILY LIVING (ADL)
ADLS_ACUITY_SCORE: 31
ADLS_ACUITY_SCORE: 18
ADLS_ACUITY_SCORE: 31
ADLS_ACUITY_SCORE: 31
ADLS_ACUITY_SCORE: 18
ADLS_ACUITY_SCORE: 31
ADLS_ACUITY_SCORE: 18
ADLS_ACUITY_SCORE: 31
ADLS_ACUITY_SCORE: 18
ADLS_ACUITY_SCORE: 31

## 2024-09-17 ASSESSMENT — LIFESTYLE VARIABLES: TOBACCO_USE: 1

## 2024-09-17 ASSESSMENT — ENCOUNTER SYMPTOMS: SEIZURES: 1

## 2024-09-17 NOTE — DISCHARGE SUMMARY
[facility]  Hospitalist Discharge Summary      Date of Admission:  9/15/2024  Date of Discharge:  9/17/2024  Discharging Provider: Esther Green PA-C  Discharge Service: Hospitalist Service    Discharge Diagnoses   Acute cholecystitis  Elevated LFTs     Clinically Significant Risk Factors          Follow-ups Needed After Discharge   Follow-up Appointments     Follow-up and recommended labs and tests       You should establish care with a primary care provider for hospital   follow- up.  No follow up labs or test are needed.  Follow up with surgery as directed            Unresulted Labs Ordered in the Past 30 Days of this Admission       Date and Time Order Name Status Description    9/17/2024  4:09 PM Surgical Pathology Exam In process         These results will be followed up by PCP    Discharge Disposition   Discharged to home  Condition at discharge: Stable    Hospital Course   You Park is a 24 year old male with past medical history significant for cholelithiasis, kidney stones, remote history of seizures but seizure-free for 8+ years, who was admitted with biliary colic.      Lab work showed transaminitis ALT > AST, elevated bilirubin.  Upper quadrant ultrasound shows acute cholecystitis and with sludge in gallbladder. Elevated LFTs concerning for possible CBD obstruction.        Biliary colic  Acute cholecystitis  Possible choledocholithiasis   Elevated LFTs concerning for possible CBD obstruction.   MRCP done, showed possible non-obstructing choledocholithiasis. LFTs are trending down  - General surgery consulted, planning for lap jaret later today  - GI consulted, given stone is non obstructing, ok with proceeding with lap jaret first. If intraoperative cholangiograms are positive, will plan for ERCP tomorrow.  - continue pain control   - No fever or chills, no clinical e/o cholangitis   - Cont IVF, NPO   - Cont abx for now   - given late timing of lap jaret today, pt will  likely stay here overnight. If cholangiograms are negative and pt has a ride home, ok to discharge tonight if he wishes    **pt was able to go to the OR earlier than planned. Intraoperative cholangiograms were negative. Pt was recovered in PACU and discharged home    Consultations This Hospital Stay   SURGERY GENERAL IP CONSULT  GASTROENTEROLOGY IP CONSULT    Code Status   Full Code    Time Spent on this Encounter   I, Esther Green PA-C, personally saw the patient today and spent less than or equal to 30 minutes discharging this patient.       Esther Green PA-C  [facility]  [address] [place] [address]  Phone: [phone]  Fax: [phone]  ______________________________________________________________________    Physical Exam   Vital Signs: Temp: 97.5  F (36.4  C) Temp src: Temporal BP: 121/57 Pulse: 70   Resp: 24 SpO2: 100 % O2 Device: Simple face mask Oxygen Delivery: 6 LPM  Weight: 175 lbs 9.6 oz    GENERAL:  Comfortable.  PSYCH: pleasant, oriented, No acute distress.  HEART:  RRR.  LUNGS:  Normal Respiratory effort.  EXTREMITIES: able to ambulate independently   NEUROLOGIC:  grossly intact        Primary Care Physician   Physician No Ref-Primary    Discharge Orders      Reason for your hospital stay    Acute cholecystitis with concern for choledocholithiasis. MRCP showed a non obstructing stone so surgery took you to the OR for lap cholecystectomy. A procedure was done during surgery that did not show any stones in the common bile duct and your gallbladder was removed. You were recovered in PACU and discharged home.     Follow-up and recommended labs and tests     You should establish care with a primary care provider for hospital follow- up.  No follow up labs or test are needed.  Follow up with surgery as directed     Activity    Your activity upon discharge: activity as tolerated, lifting restrictions per surgery     When to contact your  care team    Call your primary doctor if you have any of the following: temperature greater than 100.4, shortness of breath, increased abdominal pain, increased drainage from incisions, increasing redness around incisions, or intractable nausea or vomiting.     Diet    Follow this diet upon discharge: low fat diet is recommended, advance as tolerated       Significant Results and Procedures   Most Recent 3 CBC's:  Recent Labs   Lab Test 09/17/24  0538 09/16/24  0719 09/15/24  2230   WBC 6.7 6.9 7.7   HGB 13.5 13.5 15.4   MCV 84 85 82    224 272     Most Recent 3 BMP's:  Recent Labs   Lab Test 09/17/24  0538 09/15/24  2230 05/30/24  2329    140 139   POTASSIUM 3.5 3.9 3.6   CHLORIDE 107 103 102   CO2 19* 25 23   BUN 7.2 7.9 18.5   CR 0.83 1.03 1.10   ANIONGAP 12 12 14   SELAM 8.5* 9.7 9.1   GLC 80 107* 97     Most Recent 2 LFT's:  Recent Labs   Lab Test 09/17/24  0538 09/16/24  0719   AST 91* 237*   * 456*   ALKPHOS 185* 147   BILITOTAL 1.8* 3.6*   ,   Results for orders placed or performed during the hospital encounter of 09/15/24   US Abdomen Limited    Narrative    EXAM: US ABDOMEN LIMITED  LOCATION: [facility]  DATE: 9/16/2024    INDICATION: RUQ abdominal pain  COMPARISON: None.  TECHNIQUE: Limited abdominal ultrasound.    FINDINGS:    GALLBLADDER: Multiple stones and sludge within the gallbladder. Gallbladder wall upper limits of normal for thickness measuring 3.0 cm. No pericholecystic fluid. Positive sonographic Yeager sign.     BILE DUCTS: No biliary dilatation. The common duct measures 5.5 mm.    LIVER: Normal parenchyma with smooth contour. No focal mass. Normal direction of flow in the main portal vein.    RIGHT KIDNEY: No hydronephrosis.    PANCREAS: The visualized portions are normal.    No ascites.      Impression    IMPRESSION:  1.  Stones and sludge within the gallbladder with positive sonographic Yeager sign and gallbladder wall upper limits of normal.  Findings are suspicious for acute cholecystitis.  2.  No biliary dilatation.  3.  Remainder of exam unremarkable. Abnormal findings requiring follow-up.     MR Abdomen MRCP w/o & w Contrast    Narrative    MR ABDOMEN MRCP W/O & W CONTRAST 9/16/2024 11:51 AM    INDICATION: Abdominal pain, possible choledocholithiasis  COMPARISON: Right upper quadrant ultrasound 9/15/2024    TECHNIQUE: Routine MR liver/pancreas protocol including axial and  coronal MRCP sequences. 2D and 3D reconstruction performed by MR  technologist including MIP reconstruction and slab cholangiograms. If  performed with contrast, additional dynamic T1 post IV contrast  images.   CONTRAST: 8 cc Gadavist    FINDINGS:     MRCP: There is cholelithiasis within a borderline distended  gallbladder. Trace gallbladder wall edema without pericholecystic  fluid. There is mild intra and extra hepatic biliary ductal dilation  with extrahepatic common duct at the level of the hepatic hilum  measuring up to 8 mm. Questionable punctate nonobstructing  choledocholithiasis proximal to the ampulla (for example series 6  image 55). No definite obstructing choledocholithiasis the time of  this exam.    LIVER: No morphologic changes of chronic liver disease. No significant  iron or fat deposition. No suspicious liver lesion visualized.    PANCREAS: Normal parenchymal signal and enhancement. No ductal  dilation or surrounding inflammation.    ADDITIONAL FINDINGS: Lung bases are clear. Spleen and adrenal glands  are within normal limits. No hydronephrosis. No lymphadenopathy or  ascites. No evidence of bowel obstruction. No acute bony abnormality.      Impression    IMPRESSION:  1.  Possible nonobstructing choledocholithiasis with mild intra and  extrahepatic biliary ductal dilation as above.  2.  Cholelithiasis with mild gallbladder wall edema, early acute  cholecystitis is possible. Findings appear similar to recent right  upper quadrant ultrasound.    DIANA KOVACS  MD         SYSTEM ID:  [id number]   XR Eye Foreign Body    Narrative    XR EYE FOREIGN BODY 9/16/2024 10:58 AM     HISTORY: screen for metal prior to MRI    COMPARISON: None.       Impression    IMPRESSION: No radiodense foreign body identified in the region of the  orbits.     SYLVAIN PINEDO MD         SYSTEM ID:  [id number]   XR Surgery ROSA L/T 5 Min Fluoro w Stills    Narrative    This exam was marked as non-reportable because it will not be read by a   radiologist or a [place] non-radiologist provider.             Discharge Medications   Current Discharge Medication List        START taking these medications    Details   HYDROcodone-acetaminophen (NORCO) 5-325 MG tablet Take 1-2 tablets by mouth every 4 hours as needed for moderate to severe pain.  Qty: 10 tablet, Refills: 0    Associated Diagnoses: Choledocholithiasis           Allergies   No Known Allergies

## 2024-09-17 NOTE — PLAN OF CARE
PRIMARY DIAGNOSIS: BILIARY COLIC/UNCOMPLICATED EARLY ACUTE CHOLECYSTITIS  OUTPATIENT/OBSERVATION GOALS TO BE MET BEFORE DISCHARGE:    1. Pain status: Pain free.  2. Stable vital signs and labs (if performed) at disposition: Yes  3. Tolerating adequate PO diet: Yes  4. Successful cholecystectomy or clear follow up plan with General Surgery team if immediate surgery not performed Yes  5. ADLs back to baseline?  Yes  6. Activity and level of assistance: Ambulating independently.  7. Barriers to discharge noted Yes     Discharge Planner Nurse   Safe discharge environment identified: Yes  Barriers to discharge: Yes       Entered by: Chela Edwards RN 09/16/2024 8:08 PM     Please review provider order for any additional goals.   Nurse to notify provider when observation goals have been met and patient is ready for discharge.      Goal Outcome Evaluation:      Plan of Care Reviewed With: patient    Overall Patient Progress: no changeOverall Patient Progress: no change    Outcome Evaluation: PT a/ox4, no c/o of pain. IVF infusing. Indep in room. A/Ox4, CLear liquid diet til midnight.. then NPO.      Problem: Adult Inpatient Plan of Care  Goal: Plan of Care Review  Description: The Plan of Care Review/Shift note should be completed every shift.  The Outcome Evaluation is a brief statement about your assessment that the patient is improving, declining, or no change.  This information will be displayed automatically on your shift  note.  9/16/2024 2007 by Chela Edwards RN  Outcome: Progressing  Flowsheets (Taken 9/16/2024 2007)  Outcome Evaluation: PT a/ox4, no c/o of pain. IVF infusing. Indep in room. A/Ox4, CLear liquid diet til midnight.. then NPO.  Plan of Care Reviewed With: patient  Overall Patient Progress: no change  9/16/2024 1541 by Chela Edwards RN  Outcome: Progressing  Flowsheets (Taken 9/16/2024 1541)  Outcome Evaluation: PT a/o x4, no c/o of pain. BS faint. No c/o upon plapation. Just tender on abd  "when streatching pt states. IVF running at 100mL/hr. Indeo in room. Awaiting MRI results.  Plan of Care Reviewed With: patient  Overall Patient Progress: no change  Goal: Patient-Specific Goal (Individualized)  Description: You can add care plan individualizations to a care plan. Examples of Individualization might be:  \"Parent requests to be called daily at 9am for status\", \"I have a hard time hearing out of my right ear\", or \"Do not touch me to wake me up as it startles  me\".  9/16/2024 2007 by Chela Edwards RN  Outcome: Progressing  9/16/2024 1541 by Chela Edwards RN  Outcome: Progressing  Goal: Absence of Hospital-Acquired Illness or Injury  9/16/2024 2007 by Chela Edwards RN  Outcome: Progressing  9/16/2024 1541 by Chela Edwards RN  Outcome: Progressing  Intervention: Identify and Manage Fall Risk  Recent Flowsheet Documentation  Taken 9/16/2024 1500 by Chela Edwards RN  Safety Promotion/Fall Prevention: safety round/check completed  Intervention: Prevent Skin Injury  Recent Flowsheet Documentation  Taken 9/16/2024 1500 by Chela Edwards RN  Body Position: position changed independently  Device Skin Pressure Protection: adhesive use limited  Intervention: Prevent Infection  Recent Flowsheet Documentation  Taken 9/16/2024 1500 by Chela Edwards RN  Infection Prevention:   hand hygiene promoted   equipment surfaces disinfected  Goal: Optimal Comfort and Wellbeing  9/16/2024 2007 by Chela Edwards RN  Outcome: Progressing  9/16/2024 1541 by Chela Edwards RN  Outcome: Progressing  Goal: Readiness for Transition of Care  9/16/2024 2007 by Chela Edwards RN  Outcome: Progressing  9/16/2024 1541 by Chela Edwards RN  Outcome: Progressing     Problem: Pain Acute  Goal: Optimal Pain Control and Function  9/16/2024 2007 by Chela Edwards RN  Outcome: Progressing  9/16/2024 1541 by Chela Edwards RN  Outcome: Progressing  Intervention: Prevent or Manage Pain  Recent Flowsheet " Documentation  Taken 9/16/2024 1500 by Chela Edwards RN  Medication Review/Management: medications reviewed     Problem: Cholecystectomy  Goal: Absence of Bleeding  9/16/2024 2007 by Chela Edwards RN  Outcome: Progressing  9/16/2024 1541 by Chela Edwards RN  Outcome: Progressing  Goal: Effective Bowel Elimination  9/16/2024 2007 by Chela Edwards RN  Outcome: Progressing  9/16/2024 1541 by Chela Edwards RN  Outcome: Progressing  Goal: Fluid and Electrolyte Balance  9/16/2024 2007 by Chela Edwards RN  Outcome: Progressing  9/16/2024 1541 by Chela Edwards RN  Outcome: Progressing  Goal: Absence of Infection Signs and Symptoms  9/16/2024 2007 by Chela Edwards RN  Outcome: Progressing  9/16/2024 1541 by Chela Edwards RN  Outcome: Progressing  Goal: Anesthesia/Sedation Recovery  9/16/2024 2007 by Chela Edwards RN  Outcome: Progressing  9/16/2024 1541 by Chela Edwards RN  Outcome: Progressing  Intervention: Optimize Anesthesia Recovery  Recent Flowsheet Documentation  Taken 9/16/2024 1500 by Chela Edwards RN  Safety Promotion/Fall Prevention: safety round/check completed  Goal: Pain Control and Function  9/16/2024 2007 by Chela Edwards RN  Outcome: Progressing  9/16/2024 1541 by Chela Edwards RN  Outcome: Progressing  Goal: Nausea and Vomiting Relief  9/16/2024 2007 by Chela Edwards RN  Outcome: Progressing  9/16/2024 1541 by Chela Edwards RN  Outcome: Progressing  Goal: Effective Urinary Elimination  9/16/2024 2007 by Chela Edwards RN  Outcome: Progressing  9/16/2024 1541 by Chela Edwards RN  Outcome: Progressing  Goal: Effective Oxygenation and Ventilation  9/16/2024 2007 by Chela Edwards RN  Outcome: Progressing  9/16/2024 1541 by Chela Edwards RN  Outcome: Progressing

## 2024-09-17 NOTE — ANESTHESIA POSTPROCEDURE EVALUATION
Patient: You Park    Procedure: Procedure(s):  CHOLECYSTECTOMY, LAPAROSCOPIC, WITH CHOLANGIOGRAM       Anesthesia Type:  General    Note:  Disposition: Outpatient   Postop Pain Control: Uneventful            Sign Out: Well controlled pain   PONV: No   Neuro/Psych: Uneventful            Sign Out: Acceptable/Baseline neuro status   Airway/Respiratory: Uneventful            Sign Out: Acceptable/Baseline resp. status   CV/Hemodynamics: Uneventful            Sign Out: Acceptable CV status; No obvious hypovolemia; No obvious fluid overload   Other NRE: NONE   DID A NON-ROUTINE EVENT OCCUR? No           Last vitals:  Vitals Value Taken Time   /62 09/17/24 1650   Temp 97.5  F (36.4  C) 09/17/24 1640   Pulse 99 09/17/24 1652   Resp 49 09/17/24 1643   SpO2 100 % 09/17/24 1652   Vitals shown include unfiled device data.    Electronically Signed By: Yokasta Rojas MD  September 17, 2024  4:53 PM

## 2024-09-17 NOTE — PROVIDER NOTIFICATION
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 Brizuela notified of patient's report. No orders received. May proceed with surgery.

## 2024-09-17 NOTE — PLAN OF CARE
PRIMARY DIAGNOSIS: ACUTE PAIN  OUTPATIENT/OBSERVATION GOALS TO BE MET BEFORE DISCHARGE:  1. Pain Status: Pain free.    2. Return to near baseline physical activity: Yes    3. Cleared for discharge by consultants (if involved): No    Discharge Planner Nurse   Safe discharge environment identified: Yes  Barriers to discharge: No       Entered by: Kristina Mcgee RN 09/17/2024 4:15 AM     Please review provider order for any additional goals.   Nurse to notify provider when observation goals have been met and patient is ready for discharge.

## 2024-09-17 NOTE — PLAN OF CARE
"      Goal Outcome Evaluation:      Plan of Care Reviewed With: patient    Overall Patient Progress: no change    Outcome Evaluation: Remains NPO for surgery this evening. PIV infusing NaCl at 100ml/hr. A&Ox4 able to make needs known, Voids spontaneously without difficulty; currently denies pain.      Problem: Adult Inpatient Plan of Care  Goal: Plan of Care Review  Description: The Plan of Care Review/Shift note should be completed every shift.  The Outcome Evaluation is a brief statement about your assessment that the patient is improving, declining, or no change.  This information will be displayed automatically on your shift  note.  Outcome: Progressing  Flowsheets (Taken 9/17/2024 1056)  Outcome Evaluation:   Remains NPO for surgery this evening. PIV infusing NaCl at 100ml/hr. A&Ox4 able to make needs known, Voids spontaneously without difficulty   currently denies pain.  Plan of Care Reviewed With: patient  Overall Patient Progress: no change  Goal: Patient-Specific Goal (Individualized)  Description: You can add care plan individualizations to a care plan. Examples of Individualization might be:  \"Parent requests to be called daily at 9am for status\", \"I have a hard time hearing out of my right ear\", or \"Do not touch me to wake me up as it startles  me\".  Outcome: Progressing  Goal: Absence of Hospital-Acquired Illness or Injury  Outcome: Progressing  Intervention: Identify and Manage Fall Risk  Recent Flowsheet Documentation  Taken 9/17/2024 1000 by Arabella Miller, RN  Safety Promotion/Fall Prevention:   clutter free environment maintained   lighting adjusted   nonskid shoes/slippers when out of bed   patient and family education   safety round/check completed   room organization consistent  Intervention: Prevent Skin Injury  Recent Flowsheet Documentation  Taken 9/17/2024 1000 by Arabella Miller, RN  Body Position: position changed independently  Device Skin Pressure Protection: adhesive use " limited  Intervention: Prevent Infection  Recent Flowsheet Documentation  Taken 9/17/2024 1000 by Arabella Miller, RN  Infection Prevention:   hand hygiene promoted   equipment surfaces disinfected   cohorting utilized   rest/sleep promoted  Goal: Optimal Comfort and Wellbeing  Outcome: Progressing  Goal: Readiness for Transition of Care  Outcome: Progressing  Intervention: Mutually Develop Transition Plan  Recent Flowsheet Documentation  Taken 9/17/2024 0927 by Arabella Miller, RN  Equipment Currently Used at Home: none     Problem: Pain Acute  Goal: Optimal Pain Control and Function  Outcome: Progressing  Intervention: Prevent or Manage Pain  Recent Flowsheet Documentation  Taken 9/17/2024 1000 by Arablela Miller, RN  Bowel Elimination Promotion: adequate fluid intake promoted  Medication Review/Management: medications reviewed  Intervention: Optimize Psychosocial Wellbeing  Recent Flowsheet Documentation  Taken 9/17/2024 1000 by Arabella Miller RN  Supportive Measures:   self-care encouraged   active listening utilized     Problem: Cholecystectomy  Goal: Absence of Bleeding  Outcome: Progressing  Goal: Effective Bowel Elimination  Outcome: Progressing  Goal: Fluid and Electrolyte Balance  Outcome: Progressing  Goal: Absence of Infection Signs and Symptoms  Outcome: Progressing  Goal: Anesthesia/Sedation Recovery  Outcome: Progressing  Intervention: Optimize Anesthesia Recovery  Recent Flowsheet Documentation  Taken 9/17/2024 1000 by Arabella Miller, RN  Safety Promotion/Fall Prevention:   clutter free environment maintained   lighting adjusted   nonskid shoes/slippers when out of bed   patient and family education   safety round/check completed   room organization consistent  Goal: Pain Control and Function  Outcome: Progressing  Goal: Nausea and Vomiting Relief  Outcome: Progressing  Intervention: Prevent or Manage Nausea and Vomiting  Recent Flowsheet Documentation  Taken 9/17/2024 1000 by  Arabella Miller, RN  Nausea/Vomiting Interventions: (denies) --  Goal: Effective Urinary Elimination  Outcome: Progressing  Goal: Effective Oxygenation and Ventilation  Outcome: Progressing  Intervention: Optimize Oxygenation and Ventilation  Recent Flowsheet Documentation  Taken 9/17/2024 1000 by Arabella Miller, RN  Head of Bed (HOB) Positioning: HOB at 20-30 degrees

## 2024-09-17 NOTE — PLAN OF CARE
"  Problem: Adult Inpatient Plan of Care  Goal: Plan of Care Review  Description: The Plan of Care Review/Shift note should be completed every shift.  The Outcome Evaluation is a brief statement about your assessment that the patient is improving, declining, or no change.  This information will be displayed automatically on your shift  note.  9/17/2024 0415 by Kristina Mcgee RN  Outcome: Progressing  Flowsheets (Taken 9/17/2024 0415)  Plan of Care Reviewed With: patient  Overall Patient Progress: no change  9/17/2024 0412 by Kristina Mcgee RN  Outcome: Progressing  Flowsheets (Taken 9/17/2024 0412)  Outcome Evaluation: Patient NPO for possible surgery, denies pain, IV fluid infusing.  Up independently, A/Ox4, voiding adequately  Plan of Care Reviewed With: patient  Goal: Patient-Specific Goal (Individualized)  Description: You can add care plan individualizations to a care plan. Examples of Individualization might be:  \"Parent requests to be called daily at 9am for status\", \"I have a hard time hearing out of my right ear\", or \"Do not touch me to wake me up as it startles  me\".  9/17/2024 0415 by Kristina Mcgee RN  Outcome: Progressing  9/17/2024 0412 by Kristina Mcgee RN  Outcome: Progressing  Goal: Absence of Hospital-Acquired Illness or Injury  9/17/2024 0415 by Kristina Mcgee RN  Outcome: Progressing  9/17/2024 0412 by Kristina Mcgee RN  Outcome: Progressing  Intervention: Identify and Manage Fall Risk  Recent Flowsheet Documentation  Taken 9/17/2024 0016 by Kristina Mcgee RN  Safety Promotion/Fall Prevention:   clutter free environment maintained   lighting adjusted   nonskid shoes/slippers when out of bed   patient and family education   safety round/check completed   room organization consistent  Intervention: Prevent Skin Injury  Recent Flowsheet Documentation  Taken 9/17/2024 0016 by Kristina Mcgee RN  Body Position: position changed independently  Device Skin Pressure Protection: adhesive use " limited  Intervention: Prevent and Manage VTE (Venous Thromboembolism) Risk  Recent Flowsheet Documentation  Taken 9/17/2024 0016 by Kristina Mcgee RN  VTE Prevention/Management: SCDs off (sequential compression devices)  Goal: Optimal Comfort and Wellbeing  9/17/2024 0415 by Kristina Mcgee RN  Outcome: Progressing  9/17/2024 0412 by Kristina Mcgee RN  Outcome: Progressing  Goal: Readiness for Transition of Care  9/17/2024 0415 by Kristina Mcgee RN  Outcome: Progressing  9/17/2024 0412 by Kristina Mcgee RN  Outcome: Progressing   Goal Outcome Evaluation:      Plan of Care Reviewed With: patient    Overall Patient Progress: no changeOverall Patient Progress: no change    Outcome Evaluation: Patient NPO for possible surgery, denies pain, IV fluid infusing.  Up independently, A/Ox4, voiding adequately

## 2024-09-17 NOTE — ANESTHESIA PROCEDURE NOTES
Airway       Patient location during procedure: OR       Procedure Start/Stop Times: 9/17/2024 3:42 PM  Staff -        Performed By: CRNA  Consent for Airway        Urgency: elective  Indications and Patient Condition       Indications for airway management: salma-procedural and airway protection       Induction type:intravenous       Mask difficulty assessment: 0 - not attempted    Final Airway Details       Final airway type: endotracheal airway       Successful airway: ETT - single and Oral  Endotracheal Airway Details        ETT size (mm): 8.0       Cuffed: yes       Successful intubation technique: direct laryngoscopy       DL Blade Type: Draper 2       Grade View of Cords: 1       Adjucts: stylet       Position: Right       Measured from: lips       Secured at (cm): 24       Bite block used: None    Post intubation assessment        Placement verified by: capnometry and equal breath sounds        Number of attempts at approach: 1       Number of other approaches attempted: 0       Secured with: tape       Ease of procedure: easy       Dentition: Intact    Medication(s) Administered   Medication Administration Time: 9/17/2024 3:42 PM

## 2024-09-17 NOTE — ANESTHESIA CARE TRANSFER NOTE
Patient: You Park    Procedure: Procedure(s):  CHOLECYSTECTOMY, LAPAROSCOPIC, WITH CHOLANGIOGRAM       Diagnosis: Choledocholithiasis [K80.50]  Diagnosis Additional Information: No value filed.    Anesthesia Type:   General     Note:    Oropharynx: oral airway in place  Level of Consciousness: drowsy  Oxygen Supplementation: face mask    Independent Airway: airway patency satisfactory and stable  Dentition: dentition unchanged  Vital Signs Stable: post-procedure vital signs reviewed and stable  Report to RN Given: handoff report given  Patient transferred to: PACU    Handoff Report: Identifed the Patient, Identified the Reponsible Provider, Reviewed the pertinent medical history, Discussed the surgical course, Reviewed Intra-OP anesthesia mangement and issues during anesthesia, Set expectations for post-procedure period and Allowed opportunity for questions and acknowledgement of understanding      Vitals:  Vitals Value Taken Time   BP     Temp     Pulse 81 09/17/24 1639   Resp 26 09/17/24 1639   SpO2 100 % 09/17/24 1639   Vitals shown include unfiled device data.    Electronically Signed By: ROLF Paul CRNA  September 17, 2024  4:40 PM

## 2024-09-17 NOTE — PROGRESS NOTES
Surgery-[place]  Consult for gallstones, choledocholithiasis  Pt seen and examined, MRCP and labs reviewed; suspected non-obstructing stone on MR, bili now 1.8  He feels better, still sore but less acute pain than at presentation.  NAD, comfortable appearing  Abd soft, mild epigastric tenderness without guarding, no RUQ tenderness or Yeager sign.  Clinically improving; passed or retained CBD stone which is non-obstructing  Will defer to GI today regarding interventional plans.  Could attempt jaret with grams today if no plans for ERCP  Should remain NPO in the interim.

## 2024-09-17 NOTE — PROGRESS NOTES
[facility]    Medicine Progress Note - Hospitalist Service    Date of Admission:  9/15/2024    Assessment & Plan   You Park is a 24 year old male with past medical history significant for cholelithiasis, kidney stones, remote history of seizures but seizure-free for 8+ years, who was admitted with biliary colic.      Lab work showed transaminitis ALT > AST, elevated bilirubin.  Upper quadrant ultrasound shows acute cholecystitis and with sludge in gallbladder. Elevated LFTs concerning for possible CBD obstruction.        Biliary colic  Acute cholecystitis  Possible choledocholithiasis   Elevated LFTs concerning for possible CBD obstruction.   MRCP done, showed possible non-obstructing choledocholithiasis. LFTs are trending down  - General surgery consulted, planning for lap jaret later today  - GI consulted, given stone is non obstructing, ok with proceeding with lap jaret first. If intraoperative cholangiograms are positive, will plan for ERCP tomorrow.  - continue pain control   - No fever or chills, no clinical e/o cholangitis   - Cont IVF, NPO   - Cont abx for now   - given late timing of lap jaret today, pt will likely stay here overnight. If cholangiograms are negative and pt has a ride home, ok to discharge tonight if he wishes        Diet: NPO per Anesthesia Guidelines for Procedure/Surgery Except for: Meds    DVT Prophylaxis: Pneumatic Compression Devices  Craig Catheter: Not present  Lines: None     Cardiac Monitoring: None  Code Status: Full Code      Clinically Significant Risk Factors Present on Admission          # Hypocalcemia: Lowest Ca = 8.5 mg/dL in last 2 days, will monitor and replace as appropriate                                  Disposition Plan     Medically Ready for Discharge: Anticipated Tomorrow           The patient's care was discussed with the Bedside Nurse, Patient, and GI and surgery Consultant(s).    Esther Green PA-C  Hospitalist Service    [facility]  Securely message with [employer] (more info)  Text page via [employer] Paging/Directory   ______________________________________________________________________    Interval History   Pain improved but still present. Understands plan    Physical Exam   Vital Signs: Temp: 98  F (36.7  C) Temp src: Oral BP: 122/60 Pulse: 50   Resp: 16 SpO2: 97 % O2 Device: None (Room air)    Weight: 175 lbs 9.6 oz    GENERAL:  Comfortable.  PSYCH: pleasant, oriented, No acute distress.  HEART:  RRR.  LUNGS:  Normal Respiratory effort.  EXTREMITIES: able to ambulate independently   NEUROLOGIC:  grossly intact    Medical Decision Making       45 MINUTES SPENT BY ME on the date of service doing chart review, history, exam, documentation & further activities per the note.      Data     I have personally reviewed the following data over the past 24 hrs:    6.7  \   13.5   / 192     138 107 7.2 /  80   3.5 19 (L) 0.83 \     ALT: 323 (H) AST: 91 (H) AP: 185 (H) TBILI: 1.8 (H)   ALB: 3.5 TOT PROTEIN: 5.7 (L) LIPASE: N/A       Imaging results reviewed over the past 24 hrs:   Recent Results (from the past 24 hour(s))   MR Abdomen MRCP w/o & w Contrast    Narrative    MR ABDOMEN MRCP W/O & W CONTRAST 9/16/2024 11:51 AM    INDICATION: Abdominal pain, possible choledocholithiasis  COMPARISON: Right upper quadrant ultrasound 9/15/2024    TECHNIQUE: Routine MR liver/pancreas protocol including axial and  coronal MRCP sequences. 2D and 3D reconstruction performed by MR  technologist including MIP reconstruction and slab cholangiograms. If  performed with contrast, additional dynamic T1 post IV contrast  images.   CONTRAST: 8 cc Gadavist    FINDINGS:     MRCP: There is cholelithiasis within a borderline distended  gallbladder. Trace gallbladder wall edema without pericholecystic  fluid. There is mild intra and extra hepatic biliary ductal dilation  with extrahepatic common duct at the level of the hepatic hilum  measuring up to  8 mm. Questionable punctate nonobstructing  choledocholithiasis proximal to the ampulla (for example series 6  image 55). No definite obstructing choledocholithiasis the time of  this exam.    LIVER: No morphologic changes of chronic liver disease. No significant  iron or fat deposition. No suspicious liver lesion visualized.    PANCREAS: Normal parenchymal signal and enhancement. No ductal  dilation or surrounding inflammation.    ADDITIONAL FINDINGS: Lung bases are clear. Spleen and adrenal glands  are within normal limits. No hydronephrosis. No lymphadenopathy or  ascites. No evidence of bowel obstruction. No acute bony abnormality.      Impression    IMPRESSION:  1.  Possible nonobstructing choledocholithiasis with mild intra and  extrahepatic biliary ductal dilation as above.  2.  Cholelithiasis with mild gallbladder wall edema, early acute  cholecystitis is possible. Findings appear similar to recent right  upper quadrant ultrasound.    DIANA KOVACS MD         SYSTEM ID:  [id number]

## 2024-09-17 NOTE — ANESTHESIA PREPROCEDURE EVALUATION
Anesthesia Pre-Procedure Evaluation    Patient: You Park   MRN: [id number] : 2000        Procedure : Procedure(s):  CHOLECYSTECTOMY, LAPAROSCOPIC, WITH CHOLANGIOGRAM          Past Medical History:   Diagnosis Date    Seizures (H)       History reviewed. No pertinent surgical history.   No Known Allergies   Social History     Tobacco Use    Smoking status: Never     Passive exposure: Never    Smokeless tobacco: Never    Tobacco comments:     vapor smokes   Substance Use Topics    Alcohol use: Not on file      Wt Readings from Last 1 Encounters:   24 79.7 kg (175 lb 9.6 oz)        Anesthesia Evaluation   Pt has had prior anesthetic.     No history of anesthetic complications       ROS/MED HX  ENT/Pulmonary:     (+)                tobacco use, Current use,                       Neurologic:     (+)       seizures,                         Cardiovascular:  - neg cardiovascular ROS     METS/Exercise Tolerance:     Hematologic:  - neg hematologic  ROS     Musculoskeletal:  - neg musculoskeletal ROS     GI/Hepatic:     (+)          cholecystitis/cholelithiasis,          Renal/Genitourinary:     (+)       Nephrolithiasis ,       Endo:  - neg endo ROS     Psychiatric/Substance Use:  - neg psychiatric ROS     Infectious Disease:  - neg infectious disease ROS     Malignancy:       Other:            Physical Exam    Airway        Mallampati: II   TM distance: > 3 FB   Neck ROM: full   Mouth opening: > 3 cm    Respiratory Devices and Support         Dental       (+) Multiple visibly decayed, broken teeth      Cardiovascular   cardiovascular exam normal          Pulmonary   pulmonary exam normal                OUTSIDE LABS:  CBC:   Lab Results   Component Value Date    WBC 6.7 2024    WBC 6.9 2024    HGB 13.5 2024    HGB 13.5 2024    HCT 39.4 (L) 2024    HCT 40.4 2024     2024     2024     BMP:   Lab Results   Component Value Date      "09/17/2024     09/15/2024    POTASSIUM 3.5 09/17/2024    POTASSIUM 3.9 09/15/2024    CHLORIDE 107 09/17/2024    CHLORIDE 103 09/15/2024    CO2 19 (L) 09/17/2024    CO2 25 09/15/2024    BUN 7.2 09/17/2024    BUN 7.9 09/15/2024    CR 0.83 09/17/2024    CR 1.03 09/15/2024    GLC 80 09/17/2024     (H) 09/15/2024     COAGS: No results found for: \"PTT\", \"INR\", \"FIBR\"  POC: No results found for: \"BGM\", \"HCG\", \"HCGS\"  HEPATIC:   Lab Results   Component Value Date    ALBUMIN 3.5 09/17/2024    PROTTOTAL 5.7 (L) 09/17/2024     (H) 09/17/2024    AST 91 (H) 09/17/2024    ALKPHOS 185 (H) 09/17/2024    BILITOTAL 1.8 (H) 09/17/2024     OTHER:   Lab Results   Component Value Date    SELAM 8.5 (L) 09/17/2024    LIPASE 24 09/15/2024       Anesthesia Plan    ASA Status:  2       Anesthesia Type: General.     - Airway: ETT   Induction: Intravenous.   Maintenance: Balanced.        Consents    Anesthesia Plan(s) and associated risks, benefits, and realistic alternatives discussed. Questions answered and patient/representative(s) expressed understanding.     - Discussed:     - Discussed with:  Patient      - Extended Intubation/Ventilatory Support Discussed: No.      - Patient is DNR/DNI Status: No     Use of blood products discussed: No .     Postoperative Care    Pain management: IV analgesics, Oral pain medications, Multi-modal analgesia.   PONV prophylaxis: Dexamethasone or Solumedrol, Ondansetron (or other 5HT-3)     Comments:               Shaw Brizuela MD    I have reviewed the pertinent notes and labs in the chart from the past 30 days and (re)examined the patient.  Any updates or changes from those notes are reflected in this note.       # Hypocalcemia: Lowest Ca = 8.5 mg/dL in last 2 days, will monitor and replace as appropriate            "

## 2024-09-17 NOTE — DISCHARGE INSTRUCTIONS
You had Norco 5mg at 5:15pm. Next dose of Norco anytime after 9:15pm if needed.  Maximum acetaminophen (Tylenol) dose from all sources should not exceed 4 grams (4000 mg) per day. Each Norco tablet contains 325mg of Tylenol.      HOME CARE FOLLOWING LAPAROSCOPIC CHOLECYSTECTOMY  JOON Gonzales, MIREILLE Tsai, HELEN Concepcion    INCISIONAL CARE:  Replace the bandage over your incisions DAILY until all drainage stops, or if more comfortable to have in place.  If present, leave the steri-strips (white paper tapes) in place for 14 days after surgery.  If Dermabond (a type of skin glue) is present, leave in place until it wears/flakes off (2-3 weeks).     BATHING:  OK to shower 48 hours after surgery.  Avoid baths for 1 week after surgery.  You may wash your hair at any time.  Gently pat your incision dry after bathing.  Do not apply lotions, creams, or ointments to incisions.    ACTIVITY:  Light Activity -- you may immediately be up and about as tolerated.  Walking is encouraged, increase as tolerated.  Driving/Light Work-- when comfortable and off narcotic pain medications.  Strenuous Work/Activity -- limit lifting to 20 pounds for 2 weeks.  Progressively increase with time.  Active Sports (running, biking, etc.) -- cautiously resume after 2 weeks.    DISCOMFORT:  Local anesthetic placed at surgery should provide relief for 4-8 hours.  Begin taking pain pills before discomfort is severe.  Take the pain medication with some food, when possible, to minimize side effects.  Intermittent use of ice packs may help during the first 1-3 weeks after surgery.  Expect gradual improvement.    Over-the-counter anti-inflammatory medications (i.e. Ibuprofen/Advil/Motrin or Naprosyn/Aleve) may be used per package instructions in addition to or while tapering off the narcotic pain medications to decrease swelling and sensitivity.  DO NOT TAKE these Anti-inflammatory medications if your primary physician  has advised against doing so, or if you have acid reflux, ulcer, or bleeding disorder, or take blood-thinner medications.  Call your primary physician or the surgery office if you have medication questions.    After laparoscopic cholecystectomy, you may have shoulder or upper back discomfort due to the gas used during surgery.  This is temporary and should resolve within 2-3 days.  Frequent short walks may help with this.  You may have decreased energy level for 1-2 weeks after surgery related to your recovery.    DIET:  Start with liquids and gradually increase diet as tolerated.  Drink plenty of fluids.  While taking pain medications, consider use of a stool softener, increase your fiber in your diet, or add a fiber supplement (like Metamucil, Citrucel) to help prevent constipation - a possible side effect of pain medications.  It is not uncommon to experience some bowel changes (loose stools or constipation) after surgery.  Your body has to adapt to you no longer having a gall bladder.  To help minimize this side effect, avoid fatty foods for 1-2 weeks after surgery.  You may then slowly increase the amount of fatty foods in your diet.      NAUSEA:  If nauseated from the anesthetic/pain meds; rest in bed, get up cautiously with assistance, and drink clear liquids (juice, tea, broth).    FOLLOW-UP AFTER SURGERY:  -Our office will contact you approximately 2-3 weeks after surgery to check on your progress and answer any questions you may have.  If you are doing well, you will not need to return for an office appointment.  If any concerns are identified over the phone, we will help you make an appointment to see a provider.    -If you have not received a phone call, have any questions or concerns, or would like to be seen, please call us at [phone].  [facility] at: [address] [facility] [address]; [place] [address]    -CONTACT US IF THE FOLLOWING DEVELOPS:   1. A fever that is above 101     2. Increased  redness, warmth, drainage, bleeding, or swelling.   3. Pain that is not relieved by rest/ice and your prescription.   4.  Increasing pain after 48 hours.   5. Drainage that is thick, cloudy, yellow, green or white.   6. Any other questions or concerns.      FREQUENTLY ASKED QUESTIONS:    Q:  How should my incision look?    A:  Normally your incision will appear slightly swollen with light redness directly along the incision itself as it heals.  It may feel like a bump or ridge as the healing/scarring happens, and over time (3-4 months) this bump or ridge feeling should slowly go away.  In general, clear or pink watery drainage can be normal at first as your incision heals, but should decrease over time.    Q:  How do I know if my incision is infected?  A:  Look at your incision for signs of infection, like redness around the incision spreading to surrounding skin, or drainage of cloudy or foul-smelling drainage.  If you feel warm, check your temperature to see if you are running a fever.    **If any of these things occur, please notify the nurse at our office.  We may need you to come into the office for an incision check.      Q:  How do I take care of my incision?  A:  If you have a dressing in place - Starting the day after surgery, replace the dressing 1-2 times a day until there is no further drainage from the incision.  At that time, a dressing is no longer needed.  Try to minimize tape on the skin if irritation is occurring at the tape sites.  If you have significant irritation from tape on the skin, please call the office to discuss other method of dressing your incision.    Small pieces of tape called  steri-strips  may be present directly overlying your incision; these may be removed 10 days after surgery unless otherwise specified by your surgeon.  If these tapes start to loosen at the ends, you may trim them back until they fall off or are removed.    A:  If you had  Dermabond  tissue glue used as a  dressing (this causes your incision to look shiny with a clear covering over it) - This type of dressing wears off with time and does not require more dressings over the top unless it is draining around the glue as it wears off.  Do not apply ointments or lotions over the incisions until the glue has completely worn off.    Q:  There is a piece of tape or a sticky  lead  still on my skin.  Can I remove this?  A:  Sometimes the sticky  leads  used for monitoring during surgery or for evaluation in the emergency department are not all removed while you are in the hospital.  These sometimes have a tab or metal dot on them.  You can easily remove these on your own, like taking off a band-aid.  If there is a gel substance under the  lead , simply wipe/clean it off with a washcloth or paper towel.      Q:  What can I do to minimize constipation (very hard stools, or lack of stools)?  A:  Stay well hydrated.  Increase your dietary fiber intake or take a fiber supplement -with plenty of water.  Walk around frequently.  You may consider an over-the-counter stool-softener.  Your Pharmacist can assist you with choosing one that is stocked at your pharmacy.  Constipation is also one of the most common side effects of pain medication.  If you are using pain medication, be pro-active and try to PREVENT problems with constipation by taking the steps above BEFORE constipation becomes a problem.    Q:  What do I do if I need more pain medications?  A:  Call the office to receive refills.  Be aware that certain pain meds cannot be called into a pharmacy and actually require a paper prescription.  A change may be made in your pain med as you progress thru your recovery period or if you have side effects to certain meds.    --Pain meds are NOT refilled after 5pm on weekdays, and NOT AT ALL on the weekends, so please look ahead to prevent problems.      Q:  Why am I having a hard time sleeping now that I am at home?  A:  Many  medications you receive while you are in the hospital can impact your sleep for a number of days after your surgery/hospitalization.  Decreased level of activity and naps during the day may also make sleeping at night difficult.  Try to minimize day-time naps, and get up frequently during the day to walk around your home during your recovery time.  Sleep aides may be of some help, but are not recommended for long-term use.      Q:  I am having some back discomfort.  What should I do?  A:  This may be related to certain positioning that was required for your surgery, extended periods of time in bed, or other changes in your overall activity level.  You may try ice, heat, acetaminophen, or ibuprofen to treat this temporarily.  Note that many pain medications have acetaminophen in them and would state this on the prescription bottle.  Be sure not to exceed the maximum of 4000mg per day of acetaminophen.     **If the pain you are having does not resolve, is severe, or is a flare of back pain you have had on other occasions prior to surgery, please contact your primary physician for further recommendations or for an appointment to be examined at their office.    Q:  Why am I having headaches?  A:  Headaches can be caused by many things:  caffeine withdrawal, use of pain meds, dehydration, high blood pressure, lack of sleep, over-activity/exhaustion, flare-up of usual migraine headaches.  If you feel this is related to muscle tension (a band-like feeling around the head, or a pressure at the low-back of the head) you may try ice or heat to this area.  You may need to drink more fluids (try electrolyte drink like Gatorade), rest, or take your usual migraine medications.   **If your headaches do not resolve, worsen, are accompanied by other symptoms, or if your blood pressure is high, please call your primary physician for recommendation and/or examination.    Q:  I am unable to urinate.  What do I do?  A:  A small  percentage of people can have difficulty urinating initially after surgery.  This includes being able to urinate only a very small amount at a time and feeling discomfort or pressure in the very low abdomen.  This is called  urinary retention , and is actually an urgent situation.  Proceed to your nearest Emergency department for evaluation (not an Urgent Care Center).  Sometimes the bladder does not work correctly after certain medications you receive during surgery, or related to certain procedures.  You may need to have a catheter placed until your bladder recovers.  When planning to go to an Emergency department, it may help to call the ER to let them know you are coming in for this problem after a surgery.  This may help you get in quicker to be evaluated.  **If you have symptoms of a urinary tract infection, please contact your primary physician for the proper evaluation and treatment.          If you have other questions, please call the office Monday thru Friday between 8am and 4:30pm to discuss with the nurse or physician assistant.  #[phone]    There is a surgeon ON CALL on weekday evenings and over the weekend in case of urgent need only, and may be contacted at the same number.    If you are having an emergency, call 911 or proceed to your nearest emergency department.

## 2024-09-17 NOTE — PROGRESS NOTES
GASTROENTEROLOGY PROGRESS NOTE        SUBJECTIVE: No abdominal pain, no fever and no chills.  No nausea or vomiting.     OBJECTIVE:    /60 (BP Location: Left arm)   Pulse 50   Temp 98  F (36.7  C) (Oral)   Resp 16   Wt 79.7 kg (175 lb 9.6 oz)   SpO2 97%   BMI 28.34 kg/m    Temp (24hrs), Av.2  F (36.8  C), Min:97.8  F (36.6  C), Max:98.6  F (37  C)    Patient Vitals for the past 72 hrs:   Weight   24 0152 79.7 kg (175 lb 9.6 oz)   09/15/24 2212 79.3 kg (174 lb 13.2 oz)       Intake/Output Summary (Last 24 hours) at 2024 1111  Last data filed at 2024 0700  Gross per 24 hour   Intake 800 ml   Output --   Net 800 ml        PHYSICAL EXAM     Constitutional: No acute distress    Abdomen: Soft, nontender, no rebound or guarding         Additional Comments:  ROS, FH, SH: See initial GI consult for details.     I have reviewed the patient's new clinical lab results:     Recent Labs   Lab Test 09/17/24  0538 09/16/24  0719 09/15/24  2230   WBC 6.7 6.9 7.7   HGB 13.5 13.5 15.4   MCV 84 85 82    224 272     Recent Labs   Lab Test 09/17/24  0538 09/15/24  2230 05/30/24  2329   POTASSIUM 3.5 3.9 3.6   CHLORIDE 107 103 102   CO2 19* 25 23   BUN 7.2 7.9 18.5   ANIONGAP 12 12 14     Recent Labs   Lab Test 24  0719 09/15/24  2230 05/31/24  0132 24  09   ALBUMIN 3.5 4.0 4.8  --    < > 4.6  --    BILITOTAL 1.8* 3.6* 3.8*  --    < > 1.0  --    * 456* 529*  --    < > 31  --    AST 91* 237* 357*  --    < > 22  --    PROTEIN  --   --   --  Negative  --   --  Negative   LIPASE  --   --  24  --   --  25  --     < > = values in this interval not displayed.       ASSESSMENT/ PLAN    You Park is a 24 year old with medical history of kidney stones, who presented to the emergency room with 24 hours of epigastric pain found to have elevated LFTs with ultrasound evidence of stones and sludge within the gallbladder, findings suspicious for  acute cholecystitis and possible choledocholithiasis.     1.  Epigastric abdominal pain: Sudden onset of abdominal pain made worse by eating.  Ultrasound with evidence of stones and sludge in the gallbladder and concern for acute cholecystitis.  Total bilirubin elevated and now improving.  [place] pain improving.  Hemodynamically stable without leukocytosis and afebrile.     -- MRCP with possible nonobstructing choledocholithiasis with mild intra and extrahepatic biliary ductal dilation.  Common duct measuring 8 mm.  LFTs improving, no abdominal pain.  -- General urgery has been consulted with plans for cholecystectomy and IOC today.  -- If positive IOC, can go forward with ERCP tomorrow  -- N.p.o. at midnight, LFTs in the morning    Total time: 35 minutes of total time was spent providing patient care, including patient evaluation, reviewing documentation/test results, and .     Discussed with Dr. Darrick Matos PA-C  [facility] ( [facility])

## 2024-09-17 NOTE — OP NOTE
General Surgery Operative Note      Pre-operative diagnosis: possible choledocholithiasis   Post-operative diagnosis: same    Procedure: laparoscopic cholecystectomy  intraoperative cholangiogram     Surgeon: Boston De Guzman MD   Assistant(s): Janee Barroso PA-C  The Physician Assistant was medically necessary for their expertise in prepping, camera management, suctioning, suturing and retraction.   Anesthesia: general    Estimated blood loss:   10 cc     Specimens: gallbladder and contents     DESCRIPTION OF PROCEDURE: The patient was taken to the operating room and placed on the table in supine position. General endotracheal anesthesia was induced and the abdomen was prepped and draped in standard sterile fashion. An incision was made just above the umbilicus with a blade. The incision was carried down to the fascia. The fascia was incised in the midline with a blade. The peritoneum was entered bluntly with a Carm[place] clamp. Two interrupted 0 Vicryl sutures were placed at the extremes of this fascial incision. The Coretta trocar was introduced and the abdomen was insufflated with CO2. A 5 mm trocar was placed in the subxiphoid position. A 5 mm trocar was placed in the right upper quadrant, just below the costal margin at the midclavicular line. Another was placed at the anterior axillary line just below the costal margin on the right. The patient was placed in reverse Trendelenburg and right side up. The gallbladder appeared normal. The fundus of the gallbladder was grasped and retracted cephalad. The infundibulum was grasped and retracted laterally. The peritoneum over the medial and lateral aspects of the triangle of Calot was taken down with the [place] dissector.  The triangle of Calot was skeletonized, thus obtaining the critical view of safety.  The infundibulum of the gallbladder was grasped with the Edouard clamp. Thereby the cholangiogram catheter was introduced and used to canulate the infundibulum  of the gallbladder. The cholangiogram revealed a biliary tree without filling defects. The radiologist confirmed these findings. The cholangiogram catheter was removed from the abdomen. The duct was thrice clipped and then transected, leaving two clips on the cystic duct stump. The cystic artery was freed up from surrounding tissues. It was clipped twice proximally, once distally and transected with the hook scissors. A thorough evaluation of the triangle of Calot revealed no aberrant ducts or vessels. The gallbladder was then removed from the liver using the hook electrocautery. The gallbladder was passed into an Endocatch bag and removed through the umbilical trocar site. We observed the right upper quadrant carefully for hemostasis. Hemostasis was assured. We irrigated with copious amounts of sterile saline and aspirated the effluent. The right upper quadrant trocar sites were anesthetized with local anesthetic. Each of the trocars was removed under direct visualization. There was no bleeding from any of these sites.  The Coretta trocar was removed and the abdomen was evacuated of CO2.  One additional interrupted 0 Vicryl suture was placed in the umbilical trocar site fascia.  Each of the three 0 Vicryl sutures was cinched down and tied. The skin of the umbilical incision was anesthetized with local anesthetic.  All of the incisions were closed with interrupted 4-0 Vicryl subcuticular sutures and Dermabond.  The patient tolerated the procedure well.  Sponge and instrument counts were correct.   Boston Gonzalez MD

## 2024-09-17 NOTE — PLAN OF CARE
PRIMARY DIAGNOSIS: ACUTE PAIN  OUTPATIENT/OBSERVATION GOALS TO BE MET BEFORE DISCHARGE:  1. Pain Status: Improved with use of alternative comfort measures i.e.: distraction using    2. Return to near baseline physical activity: Yes    3. Cleared for discharge by consultants (if involved): No    Discharge Planner Nurse   Safe discharge environment identified: Yes  Barriers to discharge: No       Entered by: Kristina Mcgee RN 09/17/2024 1:45 AM     Please review provider order for any additional goals.   Nurse to notify provider when observation goals have been met and patient is ready for discharge.

## 2024-09-19 LAB
PATH REPORT.COMMENTS IMP SPEC: NORMAL
PATH REPORT.COMMENTS IMP SPEC: NORMAL
PATH REPORT.FINAL DX SPEC: NORMAL
PATH REPORT.GROSS SPEC: NORMAL
PATH REPORT.MICROSCOPIC SPEC OTHER STN: NORMAL
PATH REPORT.RELEVANT HX SPEC: NORMAL
PHOTO IMAGE: NORMAL

## 2024-09-24 ENCOUNTER — TELEPHONE (OUTPATIENT)
Dept: PEDIATRICS | Facility: CLINIC | Age: 24
End: 2024-09-24
Payer: COMMERCIAL

## 2024-09-25 NOTE — TELEPHONE ENCOUNTER
"Pt \"read\" Jonot on 9/24 @658pm.  -LVM x2 attempt to schedule patient with any provider sooner than 10/17.      Eden VILLEDA, - [place] [address]  [facility]    "

## 2024-09-30 ENCOUNTER — OFFICE VISIT (OUTPATIENT)
Dept: PEDIATRICS | Facility: CLINIC | Age: 24
End: 2024-09-30
Payer: COMMERCIAL

## 2024-09-30 VITALS
HEART RATE: 101 BPM | WEIGHT: 177.6 LBS | HEIGHT: 66 IN | DIASTOLIC BLOOD PRESSURE: 72 MMHG | BODY MASS INDEX: 28.54 KG/M2 | RESPIRATION RATE: 16 BRPM | OXYGEN SATURATION: 96 % | SYSTOLIC BLOOD PRESSURE: 123 MMHG

## 2024-09-30 DIAGNOSIS — R00.2 PALPITATIONS: ICD-10-CM

## 2024-09-30 DIAGNOSIS — K80.50 CHOLEDOCHOLITHIASIS: ICD-10-CM

## 2024-09-30 DIAGNOSIS — R79.89 ELEVATED LFTS: Primary | ICD-10-CM

## 2024-09-30 LAB
ALBUMIN SERPL BCG-MCNC: 4.5 G/DL (ref 3.5–5.2)
ALP SERPL-CCNC: 116 U/L (ref 40–150)
ALT SERPL W P-5'-P-CCNC: 71 U/L (ref 0–70)
ANION GAP SERPL CALCULATED.3IONS-SCNC: 13 MMOL/L (ref 7–15)
AST SERPL W P-5'-P-CCNC: 34 U/L (ref 0–45)
BILIRUB SERPL-MCNC: 0.5 MG/DL
BUN SERPL-MCNC: 12 MG/DL (ref 6–20)
CALCIUM SERPL-MCNC: 9.2 MG/DL (ref 8.8–10.4)
CHLORIDE SERPL-SCNC: 107 MMOL/L (ref 98–107)
CREAT SERPL-MCNC: 0.93 MG/DL (ref 0.67–1.17)
EGFRCR SERPLBLD CKD-EPI 2021: >90 ML/MIN/1.73M2
GLUCOSE SERPL-MCNC: 91 MG/DL (ref 70–99)
HCO3 SERPL-SCNC: 22 MMOL/L (ref 22–29)
POTASSIUM SERPL-SCNC: 3.9 MMOL/L (ref 3.4–5.3)
PROT SERPL-MCNC: 7 G/DL (ref 6.4–8.3)
SODIUM SERPL-SCNC: 142 MMOL/L (ref 135–145)

## 2024-09-30 PROCEDURE — 99214 OFFICE O/P EST MOD 30 MIN: CPT | Performed by: PHYSICIAN ASSISTANT

## 2024-09-30 PROCEDURE — 80053 COMPREHEN METABOLIC PANEL: CPT | Performed by: PHYSICIAN ASSISTANT

## 2024-09-30 PROCEDURE — 36415 COLL VENOUS BLD VENIPUNCTURE: CPT | Performed by: PHYSICIAN ASSISTANT

## 2024-09-30 NOTE — PROGRESS NOTES
"  Assessment & Plan     Elevated LFTs  Repeat labs today.  - Comprehensive metabolic panel (BMP + Alb, Alk Phos, ALT, AST, Total. Bili, TP); Future  - Comprehensive metabolic panel (BMP + Alb, Alk Phos, ALT, AST, Total. Bili, TP)    Choledocholithiasis  S/p cholestectomy. Healing well.   Surgery will contact him in two weeks to follow up via phone.    Palpitations  Patient has not experienced for months. He will return if these become recurrent.      Michael Spencer is a 24 year old, presenting for the following health issues:  Hospital F/U        9/30/2024     3:03 PM   Additional Questions   Roomed by ROGER     [facility]   Hospital Follow-up Visit:    Hospital/Nursing Home/IP Rehab Facility: [facility]  Date of Admission: 9/15/2024  Date of Discharge: 9/17/2024  Reason(s) for Admission: Gallbladder removal  Was the patient in the ICU or did the patient experience delirium during hospitalization?  No  Do you have any other stressors you would like to discuss with your provider? No    Problems taking medications regularly:  None  Medication changes since discharge: None  Problems adhering to non-medication therapy:  None    Summary of hospitalization:  [facility] discharge summary reviewed  Diagnostic Tests/Treatments reviewed.  Follow up needed: none  Other Healthcare Providers Involved in Patient s Care:         None  Update since discharge: improved.     Plan of care communicated with patient    Patient states he was told that he has an \"irregular heartbeat\" in the hospital.     Review of Systems  Constitutional, HEENT, cardiovascular, pulmonary, gi and gu systems are negative, except as otherwise noted.      Objective    /72 (BP Location: Right arm, Patient Position: Sitting, Cuff Size: Adult Large)   Pulse 101   Resp 16   Ht 1.676 m (5' 6\")   Wt 80.6 kg (177 lb 9.6 oz)   SpO2 96%   BMI 28.67 kg/m    Body mass index is 28.67 kg/m .  Physical Exam   GENERAL: alert and " no distress  NECK: no adenopathy, no asymmetry, masses, or scars  RESP: lungs clear to auscultation - no rales, rhonchi or wheezes  CV: regular rate and rhythm, normal S1 S2, no S3 or S4, no murmur  ABDOMEN: soft, multiple surgical sites. Healing. No signs of infection      No results found for any visits on 09/30/24.        Signed Electronically by: Yani Jolley PA-C

## 2024-09-30 NOTE — PROGRESS NOTES
Called [facility] at [phone] upon Evangelina's request.     I was told that they don't schedule post-op appointments for simple procedures any longer. One of their PAs will reach out to pt in 2-3 weeks to check on patients. If pts have any concerning sx's, then they will help to schedule an appointment.     Evangelina was updated with the above info.     Juan Carlos Layton RN  [facility]

## 2024-10-08 ENCOUNTER — TELEPHONE (OUTPATIENT)
Dept: SURGERY | Facility: CLINIC | Age: 24
End: 2024-10-08
Payer: COMMERCIAL

## 2024-10-08 NOTE — CONFIDENTIAL NOTE
[facility]  Post op call note     You Park was called for an update regarding his recovery.  He underwent laparoscopic cholecystectomy by Dr. Gonzalez on 9/17/2024. Today he tells me he is doing well and denies any complaints. He is eating a normal diet and his bowels are regular. He states his wounds are healing well.    The pathology revealed cholelithiasis.  This was discussed with the patient.     He was instructed to slowly and gradually resume all normal activities. He states all of his questions were answered.  He understands our discussion.  He agrees to follow up as needed or to call our office with any concerns.    Janee Barroso PA-C

## 2025-03-09 ENCOUNTER — HEALTH MAINTENANCE LETTER (OUTPATIENT)
Age: 25
End: 2025-03-09

## (undated) DEVICE — ENDO TROCAR FIRST ENTRY KII FIOS ADV FIX 05X100MM CFF03

## (undated) DEVICE — SOL NACL 0.9% INJ 250ML BAG 2B1322Q

## (undated) DEVICE — Device

## (undated) DEVICE — DRAPE LEGGINGS 8421

## (undated) DEVICE — CATH CHOLANGIOGRAM KUMAR CC-019

## (undated) DEVICE — ENDO POUCH UNIV RETRIEVAL SYSTEM INZII 10MM CD001

## (undated) DEVICE — SOL NACL 0.9% IRRIG 1000ML BOTTLE 2F7124

## (undated) DEVICE — SOL NACL 0.9% IRRIG 3000ML BAG 2B7477

## (undated) DEVICE — SU VICRYL 4-0 PS-2 18" UND J496H

## (undated) DEVICE — CLIP APPLIER ENDO 5MM M/L LIGAMAX EL5ML

## (undated) DEVICE — DECANTER BAG 2002S

## (undated) DEVICE — SUCTION CANISTER MEDIVAC LINER 3000ML W/LID 65651-530

## (undated) DEVICE — LINEN HALF SHEET 5512

## (undated) DEVICE — BLADE KNIFE SURG 11 371111

## (undated) DEVICE — ENDO TROCAR SLEEVE KII Z-THREADED 05X100MM CTS02

## (undated) DEVICE — DECANTER VIAL 2006S

## (undated) DEVICE — DRAPE C-ARM 60X42" 1013

## (undated) DEVICE — ENDO TROCAR BLUNT TIP KII BALLOON 12X100MM C0R47

## (undated) DEVICE — GLOVE BIOGEL PI MICRO SZ 7.5 48575

## (undated) DEVICE — LINEN POUCH DBL 5427

## (undated) DEVICE — ESU CORD MONOPOLAR 10'  E0510

## (undated) DEVICE — LINEN FULL SHEET 5511

## (undated) DEVICE — PREP CHLORAPREP 26ML TINTED HI-LITE ORANGE 930815

## (undated) DEVICE — RAD RX ISOVUE 300 (50ML) 61% IOPAMIDOL CHARGE PER ML

## (undated) DEVICE — GLOVE BIOGEL PI MICRO INDICATOR UNDERGLOVE SZ 8.0 48980

## (undated) DEVICE — ENDO SPONGE ENDOSTICK KITTNER 5MM CHERRY 37002010

## (undated) DEVICE — SUCTION IRR STRYKERFLOW II W/TIP 250-070-520

## (undated) DEVICE — LINEN TOWEL PACK X10 5473

## (undated) DEVICE — SU VICRYL+ 0 27 UR6 VLT VCP603H

## (undated) DEVICE — ESU GROUND PAD ADULT W/CORD E7507

## (undated) DEVICE — BAG CLEAR TRASH 1.3M 39X33" P4040C

## (undated) DEVICE — SU DERMABOND ADVANCED .7ML DNX12

## (undated) RX ORDER — FENTANYL CITRATE 50 UG/ML
INJECTION, SOLUTION INTRAMUSCULAR; INTRAVENOUS
Status: DISPENSED
Start: 2024-09-17

## (undated) RX ORDER — BUPIVACAINE HYDROCHLORIDE 5 MG/ML
INJECTION, SOLUTION EPIDURAL; INTRACAUDAL
Status: DISPENSED
Start: 2024-09-17

## (undated) RX ORDER — PROPOFOL 10 MG/ML
INJECTION, EMULSION INTRAVENOUS
Status: DISPENSED
Start: 2024-09-17

## (undated) RX ORDER — LIDOCAINE HYDROCHLORIDE 10 MG/ML
INJECTION, SOLUTION EPIDURAL; INFILTRATION; INTRACAUDAL; PERINEURAL
Status: DISPENSED
Start: 2024-09-17

## (undated) RX ORDER — HYDROCODONE BITARTRATE AND ACETAMINOPHEN 5; 325 MG/1; MG/1
TABLET ORAL
Status: DISPENSED
Start: 2024-09-17